# Patient Record
Sex: FEMALE | Race: WHITE | NOT HISPANIC OR LATINO | Employment: FULL TIME | URBAN - METROPOLITAN AREA
[De-identification: names, ages, dates, MRNs, and addresses within clinical notes are randomized per-mention and may not be internally consistent; named-entity substitution may affect disease eponyms.]

---

## 2018-01-18 ENCOUNTER — TELEPHONE (OUTPATIENT)
Dept: NURSING | Facility: CLINIC | Age: 33
End: 2018-01-18

## 2018-01-18 DIAGNOSIS — Z30.41 USES ORAL CONTRACEPTION: ICD-10-CM

## 2018-01-18 NOTE — TELEPHONE ENCOUNTER
[facility]      Last Written Prescription Date:  11/11/16  Last Fill Quantity: 84,   # refills: 4  Last Office Visit: 11/11/16  Future Office visit:    Next 5 appointments (look out 90 days)     Jan 31, 2018 11:15 AM CST   PHYSICAL with Asha Chavez MD   [facility]    [address] [place] [id number] [phone]                   Medication is being filled for 1 time refill only due to:  Patient needs to be seen because it has been more than one year since last visit.   .Pt due for annual, no appt scheduled. One month extension sent per [facility] protocol.

## 2018-01-31 ENCOUNTER — OFFICE VISIT (OUTPATIENT)
Dept: OBGYN | Facility: CLINIC | Age: 33
End: 2018-01-31
Payer: COMMERCIAL

## 2018-01-31 VITALS
HEIGHT: 70 IN | BODY MASS INDEX: 18.61 KG/M2 | SYSTOLIC BLOOD PRESSURE: 98 MMHG | WEIGHT: 130 LBS | DIASTOLIC BLOOD PRESSURE: 68 MMHG

## 2018-01-31 DIAGNOSIS — Z01.419 ENCOUNTER FOR GYNECOLOGICAL EXAMINATION WITHOUT ABNORMAL FINDING: Primary | ICD-10-CM

## 2018-01-31 PROCEDURE — 99385 PREV VISIT NEW AGE 18-39: CPT | Performed by: OBSTETRICS & GYNECOLOGY

## 2018-01-31 RX ORDER — NORGESTIMATE AND ETHINYL ESTRADIOL 0.25-0.035
1 KIT ORAL DAILY
Qty: 84 TABLET | Refills: 3 | Status: SHIPPED | OUTPATIENT
Start: 2018-01-31 | End: 2018-12-18

## 2018-01-31 RX ORDER — SPIRONOLACTONE 50 MG/1
TABLET, FILM COATED ORAL
Refills: 6 | COMMUNITY
Start: 2018-01-22 | End: 2020-03-21

## 2018-01-31 RX ORDER — SODIUM SULFACETAMIDE AND SULFUR 80; 40 MG/ML; MG/ML
LOTION TOPICAL
COMMUNITY
Start: 2017-12-22 | End: 2018-01-31

## 2018-01-31 NOTE — NURSING NOTE
"Chief Complaint   Patient presents with     Physical       Initial BP 98/68  Ht 5' 10\" (1.778 m)  Wt 130 lb (59 kg)  LMP 2018  BMI 18.65 kg/m2 Estimated body mass index is 18.65 kg/(m^2) as calculated from the following:    Height as of this encounter: 5' 10\" (1.778 m).    Weight as of this encounter: 130 lb (59 kg).  BP completed using cuff size: regular        The following HM Due: NONE      The following patient reported/Care Every where data was sent to:  P ABSTRACT QUALITY INITIATIVES [[id number]]        patient has appointment for today    Dasha Gardner CMA                "

## 2018-01-31 NOTE — MR AVS SNAPSHOT
"              After Visit Summary   1/31/2018    Jimena Irvin    MRN: [id number]           Patient Information     Date Of Birth          1985        Visit Information        Provider Department      1/31/2018 11:15 Asha García MD [facility]        Today's Diagnoses     Encounter for gynecological examination without abnormal finding    -  1       Follow-ups after your visit        Who to contact     If you have questions or need follow up information about today's clinic visit or your schedule please contact [facility] directly at [phone].  Normal or non-critical lab and imaging results will be communicated to you by [email or web address], letter or phone within 4 business days after the clinic has received the results. If you do not hear from us within 7 days, please contact the clinic through GeoOpticst or phone. If you have a critical or abnormal lab result, we will notify you by phone as soon as possible.  Submit refill requests through TerraSky or call your pharmacy and they will forward the refill request to us. Please allow 3 business days for your refill to be completed.          Additional Information About Your Visit        MyChart Information     TerraSky gives you secure access to your electronic health record. If you see a primary care provider, you can also send messages to your care team and make appointments. If you have questions, please call your primary care clinic.  If you do not have a primary care provider, please call [phone] and they will assist you.        Care EveryWhere ID     This is your Care EveryWhere ID. This could be used by other organizations to access your [facility] medical records  [id number]        Your Vitals Were     Height Last Period BMI (Body Mass Index)             5' 10\" (1.778 m) 01/24/2018 18.65 kg/m2          Blood Pressure from Last 3 Encounters:   01/31/18 98/68   11/11/16 98/70   11/10/15 104/60    Weight from Last " 3 Encounters:   01/31/18 130 lb (59 kg)   11/11/16 125 lb (56.7 kg)   11/10/15 128 lb (58.1 kg)              Today, you had the following     No orders found for display         Today's Medication Changes          These changes are accurate as of 1/31/18  2:51 PM.  If you have any questions, ask your nurse or doctor.               Start taking these medicines.        Dose/Directions    norgestimate-ethinyl estradiol 0.25-35 MG-MCG per tablet   Commonly known as:  ORTHO-CYCLEN, SPRINTEC   Used for:  Encounter for gynecological examination without abnormal finding   Started by:  Asha Chavez MD        Dose:  1 tablet   Take 1 tablet by mouth daily   Quantity:  84 tablet   Refills:  3         Stop taking these medicines if you haven't already. Please contact your care team if you have questions.     SULFACLEANSE 8/4 8-4 % Susp   Generic drug:  Sulfacetamide Sodium-Sulfur   Stopped by:  Asha Chavez MD                Where to get your medicines      These medications were sent to [place] [address] [place] [address]     Phone:  [phone]     norgestimate-ethinyl estradiol 0.25-35 MG-MCG per tablet                Primary Care Provider Office Phone # Fax #    [facility] [phone]       [address] [facility] [address]        Equal Access to Services     [facility] HospitalHUONG AH: Hadii caden sims hadasho Soomaali, waaxda luqadaha, qaybta kaalmada adeegyada, sana cleary. So [facility] [phone].    ATENCIÓN: Si habla español, tiene a thomas disposición servicios gratuitos de asistencia lingüística. Farrah al [phone].    We comply with applicable federal civil rights laws and [place] laws. We do not discriminate on the basis of race, color, national origin, age, disability, sex, sexual orientation, or gender identity.            Thank you!     Thank you for choosing [facility]  for your care. Our goal is always to provide you with excellent care. Hearing back from our patients is one way we can continue to improve our services. Please take a few minutes to complete the written survey that you may receive in the mail after your visit with us. Thank you!             Your Updated Medication List - Protect others around you: Learn how to safely use, store and throw away your medicines at www.disposemymeds.org.          This list is accurate as of 1/31/18  2:51 PM.  Always use your most recent med list.                   Brand Name Dispense Instructions for use Diagnosis    norgestimate-ethinyl estradiol 0.25-35 MG-MCG per tablet    ORTHO-CYCLEN, SPRINTEC    84 tablet    Take 1 tablet by mouth daily    Encounter for gynecological examination without abnormal finding       norgestrel-ethinyl estradiol 0.3-30 MG-MCG per tablet    LO/OVRAL    28 tablet    Take 1 tablet by mouth daily    Uses oral contraception       spironolactone 50 MG tablet    ALDACTONE     TAKE 1 TABLET BY MOUTH ONCE DAILY WITH A FULL GLASS OF WATER

## 2018-01-31 NOTE — PROGRESS NOTES
Jimena is a 32 year old  female who presents for annual exam.     Menses are regular q 28-30 days and normal lasting 5 days.  Menses flow: normal.  Patient's last menstrual period was 2018.. Using oral contraceptives for contraception.  She is possibly currently considering pregnancy.  Besides routine health maintenance, she has no other health concerns today . Thinking about pregnancy maybe in a year.    for [employer].  GYNECOLOGIC HISTORY:  Menarche: doesn't remember    Jimena is sexually active with 1 male partner(s) and is currently in monogamous relationship.    History sexually transmitted infections:No STD history  STI testing offered?  Accepted  TED exposure: No  History of abnormal Pap smear: NO - age 30- 65 PAP every 3 years recommended  Family history of breast CA: No  Family history of uterine/ovarian CA: No    Family history of colon CA: No    HEALTH MAINTENANCE:  Cholesterol: (No results found for: CHOL History of abnormal lipids: No  Mammo: no hx . History of abnormal Mammo: Not applicable.  Regular Self Breast Exams: No  Calcium/Vitamin D intake: source:  diet Adequate? No  TSH: (No results found for: TSH )  Pap; (  Lab Results   Component Value Date    PAP NIL 2016    )    HISTORY:  Obstetric History       T0      L0     SAB0   TAB0   Ectopic0   Multiple0   Live Births0         Past Medical History:   Diagnosis Date     Acne     minocycline     HSV-1 infection      Raynauds syndrome     has seen Dr. Zamora.      Past Surgical History:   Procedure Laterality Date     NO HISTORY OF SURGERY       Family History   Problem Relation Age of Onset     Family History Negative Other      Breast Cancer No family hx of      Social History     Social History     Marital status:      Spouse name: N/A     Number of children: 0     Years of education: N/A     Occupational History     manager [employer]     Social History Main Topics     Smoking status: Never Smoker  "    Smokeless tobacco: Never Used     Alcohol use 0.0 oz/week     0 Standard drinks or equivalent per week     Drug use: No     Sexual activity: Yes     Partners: Male     Birth control/ protection: Pill     Other Topics Concern     Not on file     Social History Narrative    Mom is Delmer Chahal (AJ pt.) but parents transferred to [place] for Dad's work in 2008.  Brother still lives in [place] and parents own condo here. Lived in [place] for 6 years but moved back in 2006       Current Outpatient Prescriptions:      spironolactone (ALDACTONE) 50 MG tablet, TAKE 1 TABLET BY MOUTH ONCE DAILY WITH A FULL GLASS OF WATER, Disp: , Rfl: 6     norgestrel-ethinyl estradiol (LO/OVRAL) 0.3-30 MG-MCG per tablet, Take 1 tablet by mouth daily, Disp: 28 tablet, Rfl: 0     Allergies   Allergen Reactions     Nkda [No Known Drug Allergies]        Past medical, surgical, social and family history were reviewed and updated in EPIC.    ROS:   C:     NEGATIVE for fever, chills, change in weight  I:       NEGATIVE for worrisome rashes, moles or lesions  E:     NEGATIVE for vision changes or irritation  E/M: NEGATIVE for ear, mouth and throat problems  R:     NEGATIVE for significant cough or SOB  CV:   NEGATIVE for chest pain, palpitations or peripheral edema  GI:     NEGATIVE for nausea, abdominal pain, heartburn, or change in bowel habits  :   NEGATIVE for frequency, dysuria, hematuria, vaginal discharge, or irregular bleeding  M:     NEGATIVE for significant arthralgias or myalgia  N:      NEGATIVE for weakness, dizziness or paresthesias  E:      NEGATIVE for temperature intolerance, skin/hair changes  P:      NEGATIVE for changes in mood or affect.    EXAM:  BP 98/68  Ht 5' 10\" (1.778 m)  Wt 130 lb (59 kg)  LMP 01/24/2018  BMI 18.65 kg/m2   BMI: Body mass index is 18.65 kg/(m^2).  Constitutional: healthy, alert and no distress  Head: Normocephalic. No masses, lesions, tenderness or abnormalities  Neck: Neck supple. Trachea midline. " No adenopathy. Thyroid symmetric, normal size.   Cardiovascular: RRR.   Respiratory: Negative.   Breast: Breasts reveal mild symmetric fibrocystic densities, but there are no dominant, discrete, fixed or suspicious masses found.  Gastrointestinal: Abdomen soft, non-tender, non-distended. No masses, organomegaly.  :  Vulva:  No external lesions, normal female hair distribution, no inguinal adenopathy.    Urethra:  Midline, non-tender, well supported, no discharge  Vagina:  Moist, pink, no abnormal discharge, no lesions  Uterus:  Normal size, anteverted , non-tender, freely mobile  Ovaries:  No masses appreciated, non-tender, mobile  Rectal Exam: deferred  Musculoskeletal: extremities normal  Skin: no suspicious lesions or rashes  Psychiatric: Affect appropriate, cooperative,mentation appears normal.     COUNSELING:   Reviewed preventive health counseling, as reflected in patient instructions       Regular exercise       Healthy diet/nutrition       Contraception       Family planning   reports that she has never smoked. She has never used smokeless tobacco.    Body mass index is 18.65 kg/(m^2).    FRAX Risk Assessment    ASSESSMENT:  32 year old female with satisfactory annual exam  (Z01.419) Encounter for gynecological examination without abnormal finding  (primary encounter diagnosis)  Comment:   Plan: norgestimate-ethinyl estradiol (ORTHO-CYCLEN,         SPRINTEC) 0.25-35 MG-MCG per tablet        Did better on noregestimate but also on a consistent (not changing) dose.  Discussed folic acid when ready to conceive.

## 2018-02-23 DIAGNOSIS — Z30.41 USES ORAL CONTRACEPTION: ICD-10-CM

## 2018-02-23 NOTE — TELEPHONE ENCOUNTER
norgestrel-ethinyl estradiol (LO/OVRAL) 0.3-30 MG-MCG per tablet     Last Written Prescription Date:  1/18/18  Last Fill Quantity: 28,   # refills: 0  Last Office Visit with [facility] primary care provider:  11/11/16  Future Office visit: none    Routing refill request to provider for review/approval because:  Refill denied. Seeing other  physician and received different OCP.

## 2018-02-26 ENCOUNTER — TELEPHONE (OUTPATIENT)
Dept: OBGYN | Facility: CLINIC | Age: 33
End: 2018-02-26

## 2018-02-26 NOTE — TELEPHONE ENCOUNTER
[facility] reaching out for a  RX refill- [facility]    Former pt of [facility].  Picked on call

## 2018-02-26 NOTE — TELEPHONE ENCOUNTER
LM for the pharmacist that the pt was not seen in our but at [facility] by Dr Asha Breaux. Please contact them for the medication refill.

## 2018-12-18 DIAGNOSIS — Z01.419 ENCOUNTER FOR GYNECOLOGICAL EXAMINATION WITHOUT ABNORMAL FINDING: ICD-10-CM

## 2018-12-18 NOTE — TELEPHONE ENCOUNTER
Pharmacy sent refill request for sprintec.  Pt due for AE 1/2019.  Pt can schedule to be seen in March when Dr. Chavez returns from maternity leave.  Refill can be sent once pt is scheduled.  Mayda Ramires RN

## 2018-12-19 RX ORDER — NORGESTIMATE AND ETHINYL ESTRADIOL 0.25-0.035
1 KIT ORAL DAILY
Qty: 84 TABLET | Refills: 0 | Status: SHIPPED | OUTPATIENT
Start: 2018-12-19 | End: 2019-03-12

## 2019-03-12 ENCOUNTER — OFFICE VISIT (OUTPATIENT)
Dept: OBGYN | Facility: CLINIC | Age: 34
End: 2019-03-12
Payer: COMMERCIAL

## 2019-03-12 VITALS
HEIGHT: 70 IN | WEIGHT: 125 LBS | SYSTOLIC BLOOD PRESSURE: 90 MMHG | HEART RATE: 68 BPM | BODY MASS INDEX: 17.9 KG/M2 | OXYGEN SATURATION: 98 % | DIASTOLIC BLOOD PRESSURE: 67 MMHG

## 2019-03-12 DIAGNOSIS — I73.00 RAYNAUD'S PHENOMENON WITHOUT GANGRENE: ICD-10-CM

## 2019-03-12 DIAGNOSIS — Z01.419 ENCOUNTER FOR GYNECOLOGICAL EXAMINATION WITHOUT ABNORMAL FINDING: Primary | ICD-10-CM

## 2019-03-12 DIAGNOSIS — Z23 NEED FOR TDAP VACCINATION: ICD-10-CM

## 2019-03-12 PROCEDURE — 90715 TDAP VACCINE 7 YRS/> IM: CPT | Performed by: OBSTETRICS & GYNECOLOGY

## 2019-03-12 PROCEDURE — 90471 IMMUNIZATION ADMIN: CPT | Performed by: OBSTETRICS & GYNECOLOGY

## 2019-03-12 PROCEDURE — 99395 PREV VISIT EST AGE 18-39: CPT | Mod: 25 | Performed by: OBSTETRICS & GYNECOLOGY

## 2019-03-12 RX ORDER — NORGESTIMATE AND ETHINYL ESTRADIOL 0.25-0.035
1 KIT ORAL DAILY
Qty: 84 TABLET | Refills: 3 | Status: SHIPPED | OUTPATIENT
Start: 2019-03-12 | End: 2020-02-10

## 2019-03-12 ASSESSMENT — ANXIETY QUESTIONNAIRES
7. FEELING AFRAID AS IF SOMETHING AWFUL MIGHT HAPPEN: NOT AT ALL
6. BECOMING EASILY ANNOYED OR IRRITABLE: NOT AT ALL
5. BEING SO RESTLESS THAT IT IS HARD TO SIT STILL: NOT AT ALL
2. NOT BEING ABLE TO STOP OR CONTROL WORRYING: NOT AT ALL
GAD7 TOTAL SCORE: 0
1. FEELING NERVOUS, ANXIOUS, OR ON EDGE: NOT AT ALL
3. WORRYING TOO MUCH ABOUT DIFFERENT THINGS: NOT AT ALL

## 2019-03-12 ASSESSMENT — PATIENT HEALTH QUESTIONNAIRE - PHQ9
SUM OF ALL RESPONSES TO PHQ QUESTIONS 1-9: 0
5. POOR APPETITE OR OVEREATING: NOT AT ALL

## 2019-03-12 ASSESSMENT — MIFFLIN-ST. JEOR: SCORE: 1354.31

## 2019-03-12 NOTE — PROGRESS NOTES
Jimena is a 33 year old  female who presents for annual exam.     Menses are regular q 28-30 days and normal lasting 5 days.  Menses flow: normal.  Patient's last menstrual period was 2019 (approximate).. Using oral contraceptives for contraception.  She is currently considering pregnancy maybe this fall/winter.   Besides routine health maintenance, she has no other health concerns today . Would like to see someone for her Reynaud's. Joints in hands and feet get very swollen with cold temperatures. Does not even need to be very cold, even just cooler internal temperatures. Had some evaluation by vascular surgery, antibodies negative except histone. Was thought to be drug induced from minocycline or OCs.   She may want to see someone again.   GYNECOLOGIC HISTORY:  Menarche: 16  Age at first intercourse: declined Number of lifetime partners: declined  Jimena is sexually active with 01 male partner(s) and is currently in monogamous relationship with .    History sexually transmitted infections:No STD history  STI testing offered?  Declined  TED exposure: Unknown  History of abnormal Pap smear: NO - age 30- 65 PAP every 3 years recommended  Family history of breast CA: No  Family history of uterine/ovarian CA: No    Family history of colon CA: No    HEALTH MAINTENANCE:  Cholesterol: (No results found for: CHOL History of abnormal lipids: No  Mammo: n/a . History of abnormal Mammo: No, Not applicable.  Regular Self Breast Exams: No  Calcium/Vitamin D intake: source:  dietary supplement(s) Adequate? Yes  TSH: (No results found for: TSH )  Pap; (  Lab Results   Component Value Date    PAP NIL 2016    )    HISTORY:  Obstetric History       T0      L0     SAB0   TAB0   Ectopic0   Multiple0   Live Births0         Past Medical History:   Diagnosis Date     Acne     minocycline     HSV-1 infection      Raynauds syndrome     has seen Dr. Zamora.      Past Surgical History:   Procedure  Laterality Date     NO HISTORY OF SURGERY       Family History   Problem Relation Age of Onset     Family History Negative Other      Breast Cancer No family hx of      Social History     Socioeconomic History     Marital status: Single     Spouse name: None     Number of children: 0     Years of education: None     Highest education level: None   Occupational History     Occupation: manager     Employer: [employer]   Social Needs     Financial resource strain: None     Food insecurity:     Worry: None     Inability: None     Transportation needs:     Medical: None     Non-medical: None   Tobacco Use     Smoking status: Never Smoker     Smokeless tobacco: Never Used   Substance and Sexual Activity     Alcohol use: Yes     Comment: 1-2/ day      Drug use: No     Sexual activity: Yes     Partners: Male     Birth control/protection: Pill   Lifestyle     Physical activity:     Days per week: None     Minutes per session: None     Stress: None   Relationships     Social connections:     Talks on phone: None     Gets together: None     Attends Protestant service: None     Active member of club or organization: None     Attends meetings of clubs or organizations: None     Relationship status: None     Intimate partner violence:     Fear of current or ex partner: None     Emotionally abused: None     Physically abused: None     Forced sexual activity: None   Other Topics Concern     Parent/sibling w/ CABG, MI or angioplasty before 65F 55M? Not Asked   Social History Narrative    Mom is Delmer Chahal (AJ pt.) but parents transferred to [place] for Dad's work in 2008.  Brother still lives in [place] and parents own condo here. Lived in [place] for 6 years but moved back in 2006       Current Outpatient Medications:      norgestimate-ethinyl estradiol (ORTHO-CYCLEN/SPRINTEC) 0.25-35 MG-MCG tablet, Take 1 tablet by mouth daily, Disp: 84 tablet, Rfl: 0     spironolactone (ALDACTONE) 50 MG tablet, TAKE 1 TABLET BY MOUTH ONCE  "DAILY WITH A FULL GLASS OF WATER, Disp: , Rfl: 6     Allergies   Allergen Reactions     Nkda [No Known Drug Allergies]        Past medical, surgical, social and family history were reviewed and updated in EPIC.    ROS:   C:     NEGATIVE for fever, chills, change in weight  I:       NEGATIVE for worrisome rashes, moles or lesions  E:     NEGATIVE for vision changes or irritation  E/M: NEGATIVE for ear, mouth and throat problems  R:     NEGATIVE for significant cough or SOB  CV:   NEGATIVE for chest pain, palpitations or peripheral edema  GI:     NEGATIVE for nausea, abdominal pain, heartburn, or change in bowel habits  :   NEGATIVE for frequency, dysuria, hematuria, vaginal discharge, or irregular bleeding  M:     NEGATIVE for significant arthralgias or myalgia  N:      NEGATIVE for weakness, dizziness or paresthesias  E:      NEGATIVE for temperature intolerance, skin/hair changes  P:      NEGATIVE for changes in mood or affect.    EXAM:  BP 90/67   Pulse 68   Ht 1.765 m (5' 9.5\")   Wt 56.7 kg (125 lb)   LMP 02/20/2019 (Approximate)   SpO2 98%   Breastfeeding? No   BMI 18.19 kg/m     BMI: Body mass index is 18.19 kg/m .  Constitutional: healthy, alert and no distress  Head: Normocephalic. No masses, lesions, tenderness or abnormalities  Neck: Neck supple. Trachea midline. No adenopathy. Thyroid symmetric, normal size.   Cardiovascular: RRR.   Respiratory: Negative.   Breast: Breasts reveal mild symmetric fibrocystic densities, but there are no dominant, discrete, fixed or suspicious masses found.  Gastrointestinal: Abdomen soft, non-tender, non-distended. No masses, organomegaly.  :  Vulva:  No external lesions, normal female hair distribution, no inguinal adenopathy.    Urethra:  Midline, non-tender, well supported, no discharge  Vagina:  Moist, pink, no abnormal discharge, no lesions  Uterus:  Normal size, anteverted , non-tender, freely mobile  Ovaries:  No masses appreciated, non-tender, " mobile  Rectal Exam: deferred  Musculoskeletal: extremities normal  Skin: no suspicious lesions or rashes  Psychiatric: Affect appropriate, cooperative,mentation appears normal.     COUNSELING:   Reviewed preventive health counseling, as reflected in patient instructions       Regular exercise       Healthy diet/nutrition       Family planning       Folic Acid Counseling   reports that  has never smoked. she has never used smokeless tobacco.    Body mass index is 18.19 kg/m .    FRAX Risk Assessment    ASSESSMENT:  33 year old female with satisfactory annual exam  (Z01.419) Encounter for gynecological examination without abnormal finding  (primary encounter diagnosis)  Comment:   Plan: norgestimate-ethinyl estradiol         (ORTHO-CYCLEN/SPRINTEC) 0.25-35 MG-MCG tablet            (Z23) Need for Tdap vaccination  Comment:   Plan: TDAP, IM (10 - 64 YRS) - Adacel            (I73.00) Raynaud's phenomenon without gangrene  Comment:   Plan: Can send to rheumatology when she is ready.

## 2019-03-12 NOTE — NURSING NOTE
"Chief Complaint   Patient presents with     Physical       Initial BP 90/67   Pulse 68   Ht 1.765 m (5' 9.5\")   Wt 56.7 kg (125 lb)   LMP 2019 (Approximate)   SpO2 98%   Breastfeeding? No   BMI 18.19 kg/m   Estimated body mass index is 18.19 kg/m  as calculated from the following:    Height as of this encounter: 1.765 m (5' 9.5\").    Weight as of this encounter: 56.7 kg (125 lb).  BP completed using cuff size: regular    Questioned patient about current smoking habits.  Pt. has never smoked.          The following HM Due: Vaccinations: tdap      The following patient reported/Care Every where data was sent to:  P ABSTRACT QUALITY INITIATIVES [[id number]]  N/a       patient has appointment for today              "

## 2019-03-13 ASSESSMENT — ANXIETY QUESTIONNAIRES: GAD7 TOTAL SCORE: 0

## 2019-11-08 ENCOUNTER — TELEPHONE (OUTPATIENT)
Dept: RHEUMATOLOGY | Facility: CLINIC | Age: 34
End: 2019-11-08

## 2019-11-08 NOTE — LETTER
January 6, 2020    Jimena Irvin  [address] [facility] [address]    Dear Referring Provider,  Thank you for the referral. After careful review by the Rheumatologist, your patient does not meet the criteria for an appointment. We encourage you to refer your patient to one of our [facility] sites:    [facility]    Provider of your choice  Thank you for your support and understanding.    Sincerely,  The Division of Arthritis and Autoimmune Disorders

## 2019-11-08 NOTE — TELEPHONE ENCOUNTER
[facility] Call Center    Phone Message    May a detailed message be left on voicemail: yes    Reason for Call: Other: Pt called in and wanted to be scheduled per her referral for  Raynaud's phenomenon without gangrene. Writer did explain in take process. Please advise     Action Taken: Message routed to:  [facility] ([facility]

## 2020-01-03 NOTE — TELEPHONE ENCOUNTER
Referral from Asha Chavez for Raynaud's. Per Referring OV notes, patient has swollen joints in hands and feet in colder temperatures. There is no current labs/imaging in chart. Chart has been prepared for Rheumatologist review. Review process can take 1-2 weeks.   Arabella Vences CMA   1/3/2020 2:54 PM

## 2020-01-06 NOTE — TELEPHONE ENCOUNTER
Rheumatologist has reviewed chart and has made the determination that the patient does not meet the criteria for an appointment.  A letter has been sent the referring encouraging them to send the patient to one of our community sites. Patient needs to follow up with their referring or PCP for further direction.     Arabella Vences CMA   1/6/2020 10:13 AM

## 2020-02-10 DIAGNOSIS — Z01.419 ENCOUNTER FOR GYNECOLOGICAL EXAMINATION WITHOUT ABNORMAL FINDING: ICD-10-CM

## 2020-02-10 RX ORDER — NORGESTIMATE AND ETHINYL ESTRADIOL 0.25-0.035
1 KIT ORAL DAILY
Qty: 84 TABLET | Refills: 0 | Status: SHIPPED | OUTPATIENT
Start: 2020-02-10 | End: 2020-03-21

## 2020-02-10 NOTE — TELEPHONE ENCOUNTER
Signed Prescriptions:                        Disp   Refills    norgestimate-ethinyl estradiol (ORTHO-CYCL*84 tab*0        Sig: Take 1 tablet by mouth daily  Authorizing Provider: KASI HERNANDEZ  Ordering User: FELI GONZALEZ    Rx sent. No refills given. Pharmacy will let patient know she is due for AE next month.  Feli Gonzalez RN

## 2020-03-19 ENCOUNTER — APPOINTMENT (OUTPATIENT)
Dept: OBGYN | Facility: CLINIC | Age: 35
End: 2020-03-19
Payer: COMMERCIAL

## 2020-03-21 ENCOUNTER — PRENATAL OFFICE VISIT (OUTPATIENT)
Dept: OBGYN | Facility: CLINIC | Age: 35
End: 2020-03-21
Payer: COMMERCIAL

## 2020-03-21 DIAGNOSIS — Z34.00 PRENATAL CARE, FIRST PREGNANCY: ICD-10-CM

## 2020-03-21 PROCEDURE — 99207 ZZC NO CHARGE NURSE ONLY: CPT | Performed by: OBSTETRICS & GYNECOLOGY

## 2020-03-21 RX ORDER — PRENATAL VIT/IRON FUM/FOLIC AC 27MG-0.8MG
1 TABLET ORAL DAILY
COMMUNITY
Start: 2019-11-21 | End: 2022-03-03

## 2020-03-22 ENCOUNTER — HEALTH MAINTENANCE LETTER (OUTPATIENT)
Age: 35
End: 2020-03-22

## 2020-04-06 ENCOUNTER — PRENATAL OFFICE VISIT (OUTPATIENT)
Dept: OBGYN | Facility: CLINIC | Age: 35
End: 2020-04-06
Payer: COMMERCIAL

## 2020-04-06 ENCOUNTER — TRANSCRIBE ORDERS (OUTPATIENT)
Dept: MATERNAL FETAL MEDICINE | Facility: CLINIC | Age: 35
End: 2020-04-06

## 2020-04-06 VITALS
DIASTOLIC BLOOD PRESSURE: 73 MMHG | WEIGHT: 126 LBS | SYSTOLIC BLOOD PRESSURE: 113 MMHG | BODY MASS INDEX: 18.34 KG/M2 | HEART RATE: 72 BPM

## 2020-04-06 DIAGNOSIS — O26.90 PREGNANCY RELATED CONDITION, ANTEPARTUM: Primary | ICD-10-CM

## 2020-04-06 DIAGNOSIS — Z34.01 ENCOUNTER FOR PRENATAL CARE IN FIRST TRIMESTER OF FIRST PREGNANCY: Primary | ICD-10-CM

## 2020-04-06 LAB
ABO + RH BLD: NORMAL
ABO + RH BLD: NORMAL
ALBUMIN UR-MCNC: NEGATIVE MG/DL
APPEARANCE UR: CLEAR
BILIRUB UR QL STRIP: NEGATIVE
BLD GP AB SCN SERPL QL: NORMAL
BLOOD BANK CMNT PATIENT-IMP: NORMAL
COLOR UR AUTO: YELLOW
ERYTHROCYTE [DISTWIDTH] IN BLOOD BY AUTOMATED COUNT: 12.9 % (ref 10–15)
GLUCOSE UR STRIP-MCNC: NEGATIVE MG/DL
HCT VFR BLD AUTO: 42.1 % (ref 35–47)
HGB BLD-MCNC: 14.3 G/DL (ref 11.7–15.7)
HGB UR QL STRIP: ABNORMAL
KETONES UR STRIP-MCNC: NEGATIVE MG/DL
LEUKOCYTE ESTERASE UR QL STRIP: NEGATIVE
MCH RBC QN AUTO: 31.6 PG (ref 26.5–33)
MCHC RBC AUTO-ENTMCNC: 34 G/DL (ref 31.5–36.5)
MCV RBC AUTO: 93 FL (ref 78–100)
NITRATE UR QL: NEGATIVE
PH UR STRIP: 7 PH (ref 5–7)
PLATELET # BLD AUTO: 162 10E9/L (ref 150–450)
RBC # BLD AUTO: 4.52 10E12/L (ref 3.8–5.2)
SOURCE: ABNORMAL
SP GR UR STRIP: 1.01 (ref 1–1.03)
SPECIMEN EXP DATE BLD: NORMAL
UROBILINOGEN UR STRIP-ACNC: 0.2 EU/DL (ref 0.2–1)
WBC # BLD AUTO: 8.4 10E9/L (ref 4–11)

## 2020-04-06 PROCEDURE — 85027 COMPLETE CBC AUTOMATED: CPT | Performed by: OBSTETRICS & GYNECOLOGY

## 2020-04-06 PROCEDURE — G0145 SCR C/V CYTO,THINLAYER,RESCR: HCPCS | Performed by: OBSTETRICS & GYNECOLOGY

## 2020-04-06 PROCEDURE — 99207 ZZC FIRST OB VISIT: CPT | Performed by: OBSTETRICS & GYNECOLOGY

## 2020-04-06 PROCEDURE — 87340 HEPATITIS B SURFACE AG IA: CPT | Performed by: OBSTETRICS & GYNECOLOGY

## 2020-04-06 PROCEDURE — 36415 COLL VENOUS BLD VENIPUNCTURE: CPT | Performed by: OBSTETRICS & GYNECOLOGY

## 2020-04-06 PROCEDURE — 86850 RBC ANTIBODY SCREEN: CPT | Performed by: OBSTETRICS & GYNECOLOGY

## 2020-04-06 PROCEDURE — 86762 RUBELLA ANTIBODY: CPT | Performed by: OBSTETRICS & GYNECOLOGY

## 2020-04-06 PROCEDURE — 87389 HIV-1 AG W/HIV-1&-2 AB AG IA: CPT | Performed by: OBSTETRICS & GYNECOLOGY

## 2020-04-06 PROCEDURE — 86780 TREPONEMA PALLIDUM: CPT | Performed by: OBSTETRICS & GYNECOLOGY

## 2020-04-06 PROCEDURE — 86901 BLOOD TYPING SEROLOGIC RH(D): CPT | Performed by: OBSTETRICS & GYNECOLOGY

## 2020-04-06 PROCEDURE — 86900 BLOOD TYPING SEROLOGIC ABO: CPT | Performed by: OBSTETRICS & GYNECOLOGY

## 2020-04-06 PROCEDURE — 87624 HPV HI-RISK TYP POOLED RSLT: CPT | Performed by: OBSTETRICS & GYNECOLOGY

## 2020-04-06 PROCEDURE — 81003 URINALYSIS AUTO W/O SCOPE: CPT | Performed by: OBSTETRICS & GYNECOLOGY

## 2020-04-06 PROCEDURE — 87086 URINE CULTURE/COLONY COUNT: CPT | Performed by: OBSTETRICS & GYNECOLOGY

## 2020-04-06 NOTE — NURSING NOTE
"Chief Complaint   Patient presents with     Prenatal Care     10+5       Initial /73   Pulse 72   Wt 57.2 kg (126 lb)   LMP 2020   BMI 18.34 kg/m   Estimated body mass index is 18.34 kg/m  as calculated from the following:    Height as of 3/12/19: 1.765 m (5' 9.5\").    Weight as of this encounter: 57.2 kg (126 lb).  BP completed using cuff size: regular    Questioned patient about current smoking habits.  Pt. has never smoked.          The following HM Due: pap smear      The following patient reported/Care Every where data was sent to:  P ABSTRACT QUALITY INITIATIVES [[id number]]        patient has appointment for today  Janny Rodriguez                "

## 2020-04-06 NOTE — PROGRESS NOTES
CC: New Ob visit  HPI: Jimena Irvin is a 34 year old  here for new Ob visit.  Patient's last menstrual period was 2020..  She is 10w5d by known LMP, giving her an EDC of 10/28/20.  The pregnancy was planned and she and her  are feeling excited.    This far the pregnancy has been uneventful other than mild nausea.  She works from [employer] in [employer] and is working strictly from home.    Past Medical History:   Diagnosis Date     Acne     minocycline     Chickenpox      HSV-1 infection      Raynauds syndrome     has seen Dr. Zamora.        Past Surgical History:   Procedure Laterality Date     MOUTH SURGERY      wisdom teeth     OB History    Para Term  AB Living   1 0 0 0 0 0   SAB TAB Ectopic Multiple Live Births   0 0 0 0 0      # Outcome Date GA Lbr Juan/2nd Weight Sex Delivery Anes PTL Lv   1 Current                    Current Outpatient Medications:      Prenatal Vit-Fe Fumarate-FA (PRENATAL MULTIVITAMIN W/IRON) 27-0.8 MG tablet, Take 1 tablet by mouth daily, Disp: , Rfl:     Allergies   Allergen Reactions     Nkda [No Known Drug Allergies]        Family History   Problem Relation Age of Onset     Family History Negative Other      Hypertension Father      Dementia Maternal Grandmother      Cancer Maternal Grandfather      Breast Cancer No family hx of        Past medical, social, surgical and family history were reviewed and updated in EPIC.    ROS: No TIA's or unusual headaches, no dysphagia.  No prolonged cough. No dyspnea or chest pain on exertion.  No abdominal pain, change in bowel habits, black or bloody stools.  No urinary tract symptoms.  No new or unusual musculoskeletal symptoms.  Normal menses, no abnormal vaginal bleeding, discharge or unexpected pelvic pain. No new breast lumps, breast pain or nipple discharge.    PE: /73   Pulse 72   Wt 57.2 kg (126 lb)   LMP 2020   BMI 18.34 kg/m      Gen: Healthy appearing female in no acute distress  Heart:  RRR  Lungs: CTAB  Abd: +BS, SNT  Ex: No C/C/E, no suspicious rashes or lesions    Pelvic: Normal vulva and vagina with scant white d/c.  Cervix without lesions, no CMT.  Uterus approximately 10 week size, mobile, NT.  Adnexa NT, no masses.    BSUS: single live IUP, +FCA, +FM.  CRL 10w 6d    A/P:  1) IUP at 10w5d         PNL today, pap today        Reviewed anticipated course of prenatal care        Reviewed recommendations for weight gain, activity and diet        We discussed options for genetic screening and diagnosis including CVS/amnio, first trimester screen and quad screen.  We discussed a fetal survey will be performed around 18-20 weeks. She desires genetic screening, NIPT if covered.  Will order and she will check with insurance        Discussed COVID and pregnancy.  We discussed the modified prenatal visit schedule due to the COVID pandemic.        Discussed MD call schedule as well as role of residents and med students both in clinic and hospital.  She is ok with resident care  Discussed modified MD schedule due to COVID paindemic       Phone visit 4 weeks, office visit 8 weeks with survey    Radha Schaefer MD

## 2020-04-07 LAB
BACTERIA SPEC CULT: NO GROWTH
HBV SURFACE AG SERPL QL IA: NONREACTIVE
HIV 1+2 AB+HIV1 P24 AG SERPL QL IA: NONREACTIVE
RUBV IGG SERPL IA-ACNC: 36 IU/ML
SPECIMEN SOURCE: NORMAL
T PALLIDUM AB SER QL: NONREACTIVE

## 2020-04-08 LAB
COPATH REPORT: NORMAL
PAP: NORMAL

## 2020-04-09 ENCOUNTER — VIRTUAL VISIT (OUTPATIENT)
Dept: MATERNAL FETAL MEDICINE | Facility: CLINIC | Age: 35
End: 2020-04-09
Attending: OBSTETRICS & GYNECOLOGY
Payer: COMMERCIAL

## 2020-04-09 DIAGNOSIS — O26.90 PREGNANCY RELATED CONDITION, ANTEPARTUM: ICD-10-CM

## 2020-04-09 DIAGNOSIS — Z13.79 ENCOUNTER FOR GENETIC SCREENING FOR DOWN SYNDROME: Primary | ICD-10-CM

## 2020-04-09 PROCEDURE — 40000072 ZZH STATISTIC GENETIC COUNSELING, < 16 MIN: Mod: TEL,ZF | Performed by: GENETIC COUNSELOR, MS

## 2020-04-09 NOTE — PROGRESS NOTES
[facility] [place]  Genetic Counseling Consult    Patient: Jimena Irvin YOB: 1985   Date of Service: 20      Jimena Irvin was evaluated via a billable telephone visit at [facility] for genetic consultation.      The patient has been notified of following:    This telephone visit will be conducted via a call between you and your physician/provider. We have found that certain health care needs can be provided without the need for a physical exam. This service lets us provide the care you need with a short phone conversation. If a prescription is necessary we can send it directly to your pharmacy. If lab work is needed we can place an order for that and you can then stop by our lab to have the test done at a later time.     If during the course of the call the provider feels a telephone visit is not appropriate, you will not be charged for this service.    Impression/Plan:   1.  Jimena completed a virtual genetic consultation today. Her partner, Darron, was also present for today's discussion. After thorough discussion of genetic screening options, Jimena desires to pursue non-invasive prenatal screening (NIPT). Per her request, an NIPT testing kit will be mailed to her home address. She will then present to the [facility] outpatient laboratory for blood draw. Results are expected within 7-10 days of blood draw, and will be available in [employer].  We will contact her to discuss the results, and a copy will be forwarded to the office of the referring OB provider. She requests a detailed voice message, including fetal sex, be left at [phone] if she is unavailable.    2. Maternal serum AFP (single marker screen) is recommended after 15 weeks to screen for open neural tube defects. A quad screen should not be performed.    Pregnancy History:   /Parity:    Age at Delivery: 34 year old  PATRICIA: 10/28/2020, by Last Menstrual  Period  Gestational Age: 11w1d    No significant complications or exposures were reported in the current pregnancy.    Medical History:   Jimena cooley reported medical history is not expected to impact pregnancy management or risks to fetal development.       Family History:   A three-generation pedigree was obtained, and is scanned under the  Media  tab.   The reported family history is negative for multiple miscarriages, stillbirths, birth defects, mental retardation, known genetic conditions, and consanguinity.       Carrier Screening:   The patient reports that she and the father of the pregnancy have  ancestry:      Cystic fibrosis is an autosomal recessive genetic condition that occurs with increased frequency in individuals of  ancestry and carrier screening for this condition is available.  In addition,  screening in the [facility] includes cystic fibrosis.      Expanded carrier screening for mutations in a large panel of genes associated with autosomal recessive conditions including cystic fibrosis, spinal muscular atrophy, and others, is now available.      If Jimena and/or her partner choose to pursue carrier screening, [facility] clinic is available to assist in coordination if desired.        Risk Assessment for Chromosome Conditions:   We explained that the risk for fetal chromosome abnormalities increases with maternal age. We discussed specific features of common chromosome abnormalities, including Down syndrome, trisomy 13, trisomy 18, and sex chromosome trisomies.      - At age 34 at midtrimester, the risk to have a baby with Down syndrome is 1 in 508.     - At age 34 at midtrimester, the risk to have a baby with any chromosome abnormality is 1 in 254.        Testing Options:   We discussed the following options:   Non-invasive Prenatal Testing (NIPT)    Maternal plasma cell-free DNA testing; first trimester ultrasound with nuchal translucency and nasal bone assessment is  recommended, when appropriate    Screens for fetal trisomy 21, trisomy 13, trisomy 18, and sex chromosome aneuploidy    Cannot screen for open neural tube defects; maternal serum AFP after 15 weeks is recommended     Chorionic villus sampling (CVS)    Invasive procedure typically performed in the first trimester by which placental villi are obtained for the purpose of chromosome analysis and/or other prenatal genetic analysis    Diagnostic results; >99% sensitivity for fetal chromosome abnormalities    Cannot test for open neural tube defects; maternal serum AFP after 15 weeks is recommended     Genetic Amniocentesis    Invasive procedure typically performed in the second trimester by which amniotic fluid is obtained for the purpose of chromosome analysis and/or other prenatal genetic analysis    Diagnostic results; >99% sensitivity for fetal chromosome abnormalities    AFAFP measurement tests for open neural tube defects     Comprehensive (Level II) ultrasound: Detailed ultrasound performed between 18-22 weeks gestation to screen for major birth defects and markers for aneuploidy.      We reviewed the benefits and limitations of this testing.  Screening tests provide a risk assessment specific to the pregnancy for certain fetal chromosome abnormalities, but cannot definitively diagnose or exclude a fetal chromosome abnormality.  Follow-up genetic counseling and consideration of diagnostic testing is recommended with any abnormal screening result.        Phone call contact time  Call Started at 10:15  Call Ended at 10:47    Jimena Moreau MS, [facility]  Maternal Fetal Medicine  [facility]  Ph: [phone]  [email or web address]

## 2020-04-10 LAB
FINAL DIAGNOSIS: NORMAL
HPV HR 12 DNA CVX QL NAA+PROBE: NEGATIVE
HPV16 DNA SPEC QL NAA+PROBE: NEGATIVE
HPV18 DNA SPEC QL NAA+PROBE: NEGATIVE
SPECIMEN DESCRIPTION: NORMAL
SPECIMEN SOURCE CVX/VAG CYTO: NORMAL

## 2020-04-13 DIAGNOSIS — Z13.79 ENCOUNTER FOR GENETIC SCREENING FOR DOWN SYNDROME: ICD-10-CM

## 2020-04-13 PROCEDURE — 40000791 ZZHCL STATISTIC VERIFI PRENATAL TRISOMY 21,18,13: Performed by: OBSTETRICS & GYNECOLOGY

## 2020-04-13 PROCEDURE — 36415 COLL VENOUS BLD VENIPUNCTURE: CPT | Performed by: OBSTETRICS & GYNECOLOGY

## 2020-04-23 ENCOUNTER — TELEPHONE (OUTPATIENT)
Dept: MATERNAL FETAL MEDICINE | Facility: CLINIC | Age: 35
End: 2020-04-23

## 2020-04-23 LAB — LAB SCANNED RESULT: NORMAL

## 2020-04-23 NOTE — TELEPHONE ENCOUNTER
"4/23/2020    I called Jimena to discuss her normal \"Innatal\" cell-free fetal DNA screening results. This information was left in a detailed voice message per her request. Results came back negative for chromosome abnormalities in chromosomes 21, 18, & 13, as well as the sex chromosomes.  These normal results suggest the likelihood of fetal Down syndrome, trisomy 13, or trisomy 18 is very low. Results consistent with two sex chromosomes (XX) (See scanned report under lab tab in epic). Sex chromosome information was disclosed on Jimena's voicemail.    Discussed high detection rates for fetal Down syndrome, trisomies 13 and 18, and fetal sex chromosome abnormalities; however, not 100%. While this test is a highly accurate screen, it does not replace the diagnostic capabilities of standard prenatal diagnostic tests such as amniocentesis and CVS.      Plan:  Jimena is not currently scheduled for follow-up with [facility]. MSAFP is the appropriate second trimester screening test for open neural tube defects; the maternal quad screen is not indicated.      Jimena Moreau MS, [facility]  Licensed Genetic Counselor  Phone: [phone]  Pager: [phone]    "

## 2020-05-04 ENCOUNTER — PRENATAL OFFICE VISIT (OUTPATIENT)
Dept: OBGYN | Facility: CLINIC | Age: 35
End: 2020-05-04
Payer: COMMERCIAL

## 2020-05-04 DIAGNOSIS — Z34.02 ENCOUNTER FOR PRENATAL CARE IN SECOND TRIMESTER OF FIRST PREGNANCY: Primary | ICD-10-CM

## 2020-05-04 PROCEDURE — 99207 ZZC PRENATAL VISIT: CPT | Performed by: OBSTETRICS & GYNECOLOGY

## 2020-05-04 NOTE — PROGRESS NOTES
Doing well.   Nausea has improved. Not a lot of vomiting but nausea was pretty bad.   No headaches.   NIPT normal, having a girl. Ultrasound scheduled for 6/3 with appt.   Discussed current practices for COVID inpatient and ambulatory. [place] masking, COVID testing for labor and delivery patients, temp testing for visitors, visitor policy.   RTC 4 weeks.  Phone visit for modified prenatal care for COVID. Time 10 minutes.

## 2020-06-03 ENCOUNTER — PRENATAL OFFICE VISIT (OUTPATIENT)
Dept: OBGYN | Facility: CLINIC | Age: 35
End: 2020-06-03
Attending: OBSTETRICS & GYNECOLOGY
Payer: COMMERCIAL

## 2020-06-03 ENCOUNTER — ANCILLARY PROCEDURE (OUTPATIENT)
Dept: ULTRASOUND IMAGING | Facility: CLINIC | Age: 35
End: 2020-06-03
Attending: OBSTETRICS & GYNECOLOGY
Payer: COMMERCIAL

## 2020-06-03 VITALS
HEART RATE: 59 BPM | SYSTOLIC BLOOD PRESSURE: 96 MMHG | BODY MASS INDEX: 19.08 KG/M2 | WEIGHT: 131.1 LBS | DIASTOLIC BLOOD PRESSURE: 61 MMHG

## 2020-06-03 DIAGNOSIS — Z34.02 ENCOUNTER FOR PRENATAL CARE IN SECOND TRIMESTER OF FIRST PREGNANCY: Primary | ICD-10-CM

## 2020-06-03 DIAGNOSIS — O44.02 PLACENTA PREVIA IN SECOND TRIMESTER: ICD-10-CM

## 2020-06-03 DIAGNOSIS — Z34.01 ENCOUNTER FOR PRENATAL CARE IN FIRST TRIMESTER OF FIRST PREGNANCY: ICD-10-CM

## 2020-06-03 PROCEDURE — 82105 ALPHA-FETOPROTEIN SERUM: CPT | Performed by: OBSTETRICS & GYNECOLOGY

## 2020-06-03 PROCEDURE — 99207 ZZC PRENATAL VISIT: CPT | Performed by: OBSTETRICS & GYNECOLOGY

## 2020-06-03 PROCEDURE — 76805 OB US >/= 14 WKS SNGL FETUS: CPT | Performed by: OBSTETRICS & GYNECOLOGY

## 2020-06-03 PROCEDURE — 36415 COLL VENOUS BLD VENIPUNCTURE: CPT | Performed by: OBSTETRICS & GYNECOLOGY

## 2020-06-03 PROCEDURE — 99000 SPECIMEN HANDLING OFFICE-LAB: CPT | Performed by: OBSTETRICS & GYNECOLOGY

## 2020-06-03 NOTE — PROGRESS NOTES
[facility]- OBGYN    CC: routine prenatal visit.    S:  Patient reports feeling well.  She reports small amount of spotting after intercourse at 8 weeks, no bleeding since then.  Patient denies any cramping, leakage of fluid.        O:   Patient Vitals for the past 24 hrs:   BP Pulse Weight   06/03/20 1547 96/61 59 59.5 kg (131 lb 1.6 oz)   ]  Exam:  General- awake, alert, answering questions appropriately, appears comfortable  CV- regular rate  Lung- breathing comfortably on room air  Ext- bilateral lower extremities with no edema    Ultrasound-    Obstetrical Ultrasound Report  OB U/S > 14 Weeks - Transabdominal  [facility]  Referring Provider: Dr. Radha Schaefer  Sonographer: Guillermina Carrasco RDMS  Indication:  Fetal Anatomy Survey     Dating (mm/dd/yyyy):   LMP: Patient's last menstrual period was 01/22/2020.              EDC:  Estimated Date of Delivery: Oct 28, 2020              GA by LMP:        19w0d     Current Scan On:  6/3/2020            EDC:  10/27/20              GA by Current Scan:        19w1d  The calculation of the gestational age by current scan was based on BPD, HC, AC and FL.  Anatomy Scan:  O[place]eda gestation.  Biometry:  BPD 4.4 cm 19w3d   HC 16.4 cm 19w1d   AC 14.1 cm 19w3d   FL 2.9 cm 18w6d   Cerebellum 1.9 cm 18w5d   CM 3.0 mm     Lat Vent 5.1 mm     NF 4.6 mm     EFW (lbs/oz) 0 lbs               10ozs     EFW (g) 279 g        Fetal heart activity: Rate and rhythm is within normal limits. Fetal heart rate: 158 bpm  Fetal presentation: Cephalic  Amniotic fluid: 5.3 cm MVP    Cord: 3 Vessel Cord  Placenta: Anterior and Previa  Fetal Anatomy:   Visualized with normal appearance: Head, Ventricles, Cerebellum, CSP, Face, Nose/Lips , Profile, 4 Chamber Heart, RVOT, LVOT, Spine, Kidneys, Stomach, Diaphragm, Abdominal Cord Insertion, Bladder, Arms, Legs and Gender: Female     Maternal Structures:  Cervix: The cervix appears long and closed.  Cervical Length:  5.2cm  Right Adnexa: wnl  Left Adnexa: wnl     Impression:  19w 1d fetus with PATRICIA by today's ultrasound 10/27/2020.  The fetal anatomic survey is normal.       The placenta is anterior, previa.       Kerrie Fernandez MD       A&P: Jimena Irvin is a 34 year old  at 19w0d by LMP who presents today for routine prenatal visit.    (Z34.02) Encounter for prenatal care in second trimester of first pregnancy  (primary encounter diagnosis)  Comment: Doing well.  Discussed anatomy ultrasound preliminary results.  Reviewed finding or placenta previa and discussed recommended pelvic rest.  Reviewed AFP blood draw today.     Plan: OB hemoglobin, Glucose tolerance gest screen 1         hour, Alpha fetoprotein maternal screen        Phone visit in 4-5 weeks and in person visit in 8-9 weeks.     (O44.02) Placenta previa in second trimester  Comment: Placenta anterior previa.  Reviewed finding.  Discussed potential for resolution as uterus grows in pregnancy.  Recommend pelvic rest.  Discussed if no resolution, then  recommended for delivery.  Plan: US OB >14 Weeks Follow Up    Marion Causey MD

## 2020-06-03 NOTE — PATIENT INSTRUCTIONS
Ultrasound shows placenta previa.  This can resolve during pregnancy as the uterus grows.  We will repeat an ultrasound in 4-6 weeks.  With in person visit at that time.    Patient Education     Placenta Previa    Placenta previa is a condition that may happen during the second or third trimester of pregnancy. It's one of the most common causes of vaginal bleeding during these trimesters. It happens when the placenta implants in the lower part of the uterus. This causes the placenta to block part or all of the opening of the cervix to the vagina (birth canal). It can lead to problems for both the mother and baby, including blood loss and premature labor.  Some factors that make placenta previa more likely  Factors include the following:    Multiple pregnancy (carrying more than one baby)    Previous pregnancies and deliveries    Previous myomectomy (removal of uterine fibroids through an incision in the uterus)    Previous  section (if the scar is low and close to the vaginal cervix)    Smoking cigarettes  Diagnosing the problem  Placenta previa can cause painless bleeding during the second or third trimester. If this happens, an ultrasound test can confirm the problem. But the problem can be present without bleeding. So your healthcare provider will check the position of the placenta during routine ultrasound exams.  Treating the problem  Depending on the amount of bleeding, the type of placenta previa, and the stage of the pregnancy, the following treatments may be recommended:    Partial or complete bed rest for the mother    Blood transfusions to replace maternal blood loss    Medicines to help mature the baby s lungs or prevent premature labor     delivery (this may be done immediately if bleeding cannot be stopped)  During treatment  Even if you are not on bed rest, your healthcare provider may ask you to restrict your activity. You will likely be told to:    Avoid intercourse    Limit  traveling    Avoid pelvic exams  Getting support  It may be frustrating and frightening to be diagnosed with a problem during pregnancy. It may help to join a support group for women who are going through the same problem. Ask your healthcare provider to help you find a support group in your area. Or try the Internet.   Date Last Reviewed: 6/1/2016 2000-2019 [employer]. [address] [place] [address]. All rights reserved. This information is not intended as a substitute for professional medical care. Always follow your healthcare professional's instructions.

## 2020-06-06 LAB
# FETUSES US: NORMAL
# FETUSES: NORMAL
AFP ADJ MOM AMN: 0.87
AFP SERPL-MCNC: 47 NG/ML
AGE - REPORTED: 32.9 YR
CURRENT SMOKER: NO
CURRENT SMOKER: NO
DIABETES STATUS PATIENT: NO
FAMILY MEMBER DISEASES HX: NO
FAMILY MEMBER DISEASES HX: NO
GA METHOD: NORMAL
GA METHOD: NORMAL
GA: NORMAL WK
IDDM PATIENT QL: NO
INTEGRATED SCN PATIENT-IMP: NORMAL
LMP START DATE: NORMAL
MONOCHORIONIC TWINS: NO
SERVICE CMNT-IMP: NO
SPECIMEN DRAWN SERPL: NORMAL
VALPROIC/CARBAMAZEPINE STATUS: NO
WEIGHT UNITS: NORMAL

## 2020-07-09 ENCOUNTER — ANCILLARY PROCEDURE (OUTPATIENT)
Dept: ULTRASOUND IMAGING | Facility: CLINIC | Age: 35
End: 2020-07-09
Attending: OBSTETRICS & GYNECOLOGY
Payer: COMMERCIAL

## 2020-07-09 ENCOUNTER — PRENATAL OFFICE VISIT (OUTPATIENT)
Dept: OBGYN | Facility: CLINIC | Age: 35
End: 2020-07-09
Attending: OBSTETRICS & GYNECOLOGY
Payer: COMMERCIAL

## 2020-07-09 VITALS
WEIGHT: 133.9 LBS | DIASTOLIC BLOOD PRESSURE: 66 MMHG | HEART RATE: 67 BPM | SYSTOLIC BLOOD PRESSURE: 101 MMHG | BODY MASS INDEX: 19.49 KG/M2

## 2020-07-09 DIAGNOSIS — R73.09 ELEVATED GLUCOSE: Primary | ICD-10-CM

## 2020-07-09 DIAGNOSIS — O44.02 PLACENTA PREVIA IN SECOND TRIMESTER: ICD-10-CM

## 2020-07-09 DIAGNOSIS — R73.09 ELEVATED GLUCOSE TOLERANCE TEST: ICD-10-CM

## 2020-07-09 DIAGNOSIS — Z34.02 ENCOUNTER FOR PRENATAL CARE IN SECOND TRIMESTER OF FIRST PREGNANCY: ICD-10-CM

## 2020-07-09 DIAGNOSIS — Z34.02 ENCOUNTER FOR PRENATAL CARE IN SECOND TRIMESTER OF FIRST PREGNANCY: Primary | ICD-10-CM

## 2020-07-09 LAB
GLUCOSE 1H P 50 G GLC PO SERPL-MCNC: 130 MG/DL (ref 60–129)
HGB BLD-MCNC: 12.4 G/DL (ref 11.7–15.7)

## 2020-07-09 PROCEDURE — 36415 COLL VENOUS BLD VENIPUNCTURE: CPT | Performed by: OBSTETRICS & GYNECOLOGY

## 2020-07-09 PROCEDURE — 82950 GLUCOSE TEST: CPT | Performed by: OBSTETRICS & GYNECOLOGY

## 2020-07-09 PROCEDURE — 76815 OB US LIMITED FETUS(S): CPT | Performed by: OBSTETRICS & GYNECOLOGY

## 2020-07-09 PROCEDURE — 99207 ZZC PRENATAL VISIT: CPT | Performed by: OBSTETRICS & GYNECOLOGY

## 2020-07-09 PROCEDURE — 00000218 ZZHCL STATISTIC OBHBG - HEMOGLOBIN: Performed by: OBSTETRICS & GYNECOLOGY

## 2020-07-09 NOTE — PROGRESS NOTES
24w1d feeling good, no c/o.  Feeling lots of mvmt.  Had repeat us today, placenta no longer previa, 3cm from os.  Discussed.  Gained 9+lbs so far, but always has a hard time gaining weight, so concerned about that.  I reassured her that I am not concerned at this point, will monitor and if not gaining enough, will get growth scan.  glucola today.    Phone visit in 4 weeks, in office in 8  jlp

## 2020-07-16 DIAGNOSIS — R73.09 ELEVATED GLUCOSE TOLERANCE TEST: ICD-10-CM

## 2020-07-16 LAB
GLUCOSE 1H P 100 G GLC PO SERPL-MCNC: 133 MG/DL (ref 60–179)
GLUCOSE 2H P 100 G GLC PO SERPL-MCNC: 84 MG/DL (ref 60–154)
GLUCOSE 3H P 100 G GLC PO SERPL-MCNC: 35 MG/DL (ref 60–139)
GLUCOSE P FAST SERPL-MCNC: 72 MG/DL (ref 60–94)

## 2020-07-16 PROCEDURE — 82951 GLUCOSE TOLERANCE TEST (GTT): CPT | Performed by: OBSTETRICS & GYNECOLOGY

## 2020-07-16 PROCEDURE — 36415 COLL VENOUS BLD VENIPUNCTURE: CPT | Performed by: OBSTETRICS & GYNECOLOGY

## 2020-07-16 PROCEDURE — 82952 GTT-ADDED SAMPLES: CPT | Performed by: OBSTETRICS & GYNECOLOGY

## 2020-08-06 ENCOUNTER — PRENATAL OFFICE VISIT (OUTPATIENT)
Dept: OBGYN | Facility: CLINIC | Age: 35
End: 2020-08-06
Payer: COMMERCIAL

## 2020-08-06 DIAGNOSIS — Z34.00 SUPERVISION OF NORMAL FIRST PREGNANCY, ANTEPARTUM: Primary | ICD-10-CM

## 2020-08-06 PROCEDURE — 99207 ZZC PRENATAL VISIT: CPT | Performed by: OBSTETRICS & GYNECOLOGY

## 2020-08-06 NOTE — PROGRESS NOTES
Phone visit for modified prenatal care for COVID.  Phone time 10 minutes.    Feels well, no complaints.  One hour glucose 130, all values normal in 3 hour (final value was 35, discussed, she had had no symptoms of hypoglycemia).  Planning peds care at a clinic near her house so is researching options, discussed availability of house pediatrician (and NICU if needed) for immediate post-delivery care.  Will plan Tdap at next visit, discussed.  RTC 4 weeks in office.  AM

## 2020-09-04 ENCOUNTER — PRENATAL OFFICE VISIT (OUTPATIENT)
Dept: OBGYN | Facility: CLINIC | Age: 35
End: 2020-09-04
Payer: COMMERCIAL

## 2020-09-04 VITALS
HEART RATE: 95 BPM | WEIGHT: 144 LBS | BODY MASS INDEX: 20.96 KG/M2 | DIASTOLIC BLOOD PRESSURE: 70 MMHG | SYSTOLIC BLOOD PRESSURE: 102 MMHG | OXYGEN SATURATION: 100 %

## 2020-09-04 DIAGNOSIS — Z34.00 SUPERVISION OF NORMAL FIRST PREGNANCY, ANTEPARTUM: Primary | ICD-10-CM

## 2020-09-04 DIAGNOSIS — Z23 NEED FOR TDAP VACCINATION: ICD-10-CM

## 2020-09-04 PROCEDURE — 90471 IMMUNIZATION ADMIN: CPT | Performed by: OBSTETRICS & GYNECOLOGY

## 2020-09-04 PROCEDURE — 90715 TDAP VACCINE 7 YRS/> IM: CPT | Performed by: OBSTETRICS & GYNECOLOGY

## 2020-09-04 PROCEDURE — 99207 ZZC PRENATAL VISIT: CPT | Performed by: OBSTETRICS & GYNECOLOGY

## 2020-09-04 NOTE — PROGRESS NOTES
32w2d feeling good, no c/o.  Baby moving a lot.  Weight gain still on the lower end, but ok and FH is normal.  Tdap today Plan phone visit in 2 weeks, in office in 4 then weekly  ayse

## 2020-09-11 ENCOUNTER — HOSPITAL ENCOUNTER (OUTPATIENT)
Facility: CLINIC | Age: 35
End: 2020-09-11
Payer: COMMERCIAL

## 2020-09-17 ENCOUNTER — PRENATAL OFFICE VISIT (OUTPATIENT)
Dept: OBGYN | Facility: CLINIC | Age: 35
End: 2020-09-17
Payer: COMMERCIAL

## 2020-09-17 DIAGNOSIS — Z34.00 SUPERVISION OF NORMAL FIRST PREGNANCY, ANTEPARTUM: Primary | ICD-10-CM

## 2020-09-17 PROCEDURE — 99207 ZZC PRENATAL VISIT: CPT | Performed by: OBSTETRICS & GYNECOLOGY

## 2020-09-17 NOTE — PROGRESS NOTES
Phone visit for modified prenatal care for COVID. Call time 10 minutes.  Fetus active.  Had some questions about L&D, recovery, standard labor practices.  GBS and BSUS next, RTC 2 weeks.  AM

## 2020-10-01 ENCOUNTER — PRENATAL OFFICE VISIT (OUTPATIENT)
Dept: OBGYN | Facility: CLINIC | Age: 35
End: 2020-10-01
Payer: COMMERCIAL

## 2020-10-01 VITALS
DIASTOLIC BLOOD PRESSURE: 64 MMHG | SYSTOLIC BLOOD PRESSURE: 91 MMHG | BODY MASS INDEX: 21.25 KG/M2 | WEIGHT: 146 LBS | HEART RATE: 62 BPM | TEMPERATURE: 98.1 F

## 2020-10-01 DIAGNOSIS — Z34.00 SUPERVISION OF NORMAL FIRST PREGNANCY, ANTEPARTUM: Primary | ICD-10-CM

## 2020-10-01 DIAGNOSIS — Z23 NEED FOR PROPHYLACTIC VACCINATION AND INOCULATION AGAINST INFLUENZA: ICD-10-CM

## 2020-10-01 LAB
CAPILLARY BLOOD COLLECTION: NORMAL
HGB BLD-MCNC: 13.4 G/DL (ref 11.7–15.7)

## 2020-10-01 PROCEDURE — 99207 PR PRENATAL VISIT: CPT | Performed by: OBSTETRICS & GYNECOLOGY

## 2020-10-01 PROCEDURE — 36416 COLLJ CAPILLARY BLOOD SPEC: CPT | Performed by: OBSTETRICS & GYNECOLOGY

## 2020-10-01 PROCEDURE — 87653 STREP B DNA AMP PROBE: CPT | Performed by: OBSTETRICS & GYNECOLOGY

## 2020-10-01 PROCEDURE — 90686 IIV4 VACC NO PRSV 0.5 ML IM: CPT | Performed by: OBSTETRICS & GYNECOLOGY

## 2020-10-01 PROCEDURE — 99N1025 PR STATISTIC OBHBG - HEMOGLOBIN: Performed by: OBSTETRICS & GYNECOLOGY

## 2020-10-01 PROCEDURE — 90471 IMMUNIZATION ADMIN: CPT | Performed by: OBSTETRICS & GYNECOLOGY

## 2020-10-01 NOTE — PROGRESS NOTES
36w1d feeling ok, no c/o.  Good fm.  No ctx that she can tell.  BSUS: cephalic.  Worried about a large baby as she was 9lb9oz and  was almost 11.  EFW today feels about 6.5, but she does carry into her back, so may be under-estimating.  Discussed.  Normal to roomy pelvis.  GBS today. Flu shot given.  RTC weekly  rodrickp

## 2020-10-03 LAB
GP B STREP DNA SPEC QL NAA+PROBE: NEGATIVE
SPECIMEN SOURCE: NORMAL

## 2020-10-07 ENCOUNTER — PRENATAL OFFICE VISIT (OUTPATIENT)
Dept: OBGYN | Facility: CLINIC | Age: 35
End: 2020-10-07
Payer: COMMERCIAL

## 2020-10-07 VITALS
DIASTOLIC BLOOD PRESSURE: 71 MMHG | HEART RATE: 84 BPM | BODY MASS INDEX: 21.35 KG/M2 | SYSTOLIC BLOOD PRESSURE: 108 MMHG | TEMPERATURE: 98.7 F | OXYGEN SATURATION: 100 % | WEIGHT: 146.7 LBS

## 2020-10-07 DIAGNOSIS — Z34.00 SUPERVISION OF NORMAL FIRST PREGNANCY, ANTEPARTUM: Primary | ICD-10-CM

## 2020-10-07 PROCEDURE — 99207 PR PRENATAL VISIT: CPT | Performed by: OBSTETRICS & GYNECOLOGY

## 2020-10-07 RX ORDER — LIDOCAINE 40 MG/G
CREAM TOPICAL
Status: CANCELLED | OUTPATIENT
Start: 2020-10-07

## 2020-10-07 RX ORDER — ACETAMINOPHEN 325 MG/1
650 TABLET ORAL EVERY 4 HOURS PRN
Status: CANCELLED | OUTPATIENT
Start: 2020-10-07

## 2020-10-07 RX ORDER — SODIUM CHLORIDE, SODIUM LACTATE, POTASSIUM CHLORIDE, CALCIUM CHLORIDE 600; 310; 30; 20 MG/100ML; MG/100ML; MG/100ML; MG/100ML
INJECTION, SOLUTION INTRAVENOUS CONTINUOUS
Status: CANCELLED | OUTPATIENT
Start: 2020-10-07

## 2020-10-07 RX ORDER — OXYTOCIN 10 [USP'U]/ML
10 INJECTION, SOLUTION INTRAMUSCULAR; INTRAVENOUS
Status: CANCELLED | OUTPATIENT
Start: 2020-10-07

## 2020-10-07 RX ORDER — FENTANYL CITRATE 50 UG/ML
50-100 INJECTION, SOLUTION INTRAMUSCULAR; INTRAVENOUS
Status: CANCELLED | OUTPATIENT
Start: 2020-10-07

## 2020-10-07 RX ORDER — NALOXONE HYDROCHLORIDE 0.4 MG/ML
.1-.4 INJECTION, SOLUTION INTRAMUSCULAR; INTRAVENOUS; SUBCUTANEOUS
Status: CANCELLED | OUTPATIENT
Start: 2020-10-07

## 2020-10-07 RX ORDER — OXYCODONE AND ACETAMINOPHEN 5; 325 MG/1; MG/1
1 TABLET ORAL
Status: CANCELLED | OUTPATIENT
Start: 2020-10-07

## 2020-10-07 RX ORDER — METHYLERGONOVINE MALEATE 0.2 MG/ML
200 INJECTION INTRAVENOUS
Status: CANCELLED | OUTPATIENT
Start: 2020-10-07

## 2020-10-07 RX ORDER — IBUPROFEN 200 MG
800 TABLET ORAL
Status: CANCELLED | OUTPATIENT
Start: 2020-10-07

## 2020-10-07 RX ORDER — CARBOPROST TROMETHAMINE 250 UG/ML
250 INJECTION, SOLUTION INTRAMUSCULAR
Status: CANCELLED | OUTPATIENT
Start: 2020-10-07

## 2020-10-07 RX ORDER — ONDANSETRON 2 MG/ML
4 INJECTION INTRAMUSCULAR; INTRAVENOUS EVERY 6 HOURS PRN
Status: CANCELLED | OUTPATIENT
Start: 2020-10-07

## 2020-10-07 RX ORDER — OXYTOCIN/0.9 % SODIUM CHLORIDE 30/500 ML
100-340 PLASTIC BAG, INJECTION (ML) INTRAVENOUS CONTINUOUS PRN
Status: CANCELLED | OUTPATIENT
Start: 2020-10-07

## 2020-10-07 NOTE — PROGRESS NOTES
Did not understand that residents would be doing the actual delivery so declines their involvement now. Otherwise feeling okay and answered questions about L&D and when to call. RTC weekly until delivery.  BE    GBS: Negative  Hemoglobin   Date Value Ref Range Status   10/01/2020 13.4 11.7 - 15.7 g/dL Final   ]    Breast pump rx: done  Labor orders: signed and held  Birth plan: epidural    Length of stay: discussed  Disability paperwork: NA  Resident involvement: discussed and declines.  Discharge meds done : OTC

## 2020-10-14 ENCOUNTER — PRENATAL OFFICE VISIT (OUTPATIENT)
Dept: OBGYN | Facility: CLINIC | Age: 35
End: 2020-10-14
Payer: COMMERCIAL

## 2020-10-14 VITALS
HEART RATE: 55 BPM | BODY MASS INDEX: 21.09 KG/M2 | HEIGHT: 70 IN | DIASTOLIC BLOOD PRESSURE: 77 MMHG | SYSTOLIC BLOOD PRESSURE: 105 MMHG | WEIGHT: 147.3 LBS | TEMPERATURE: 97.1 F

## 2020-10-14 DIAGNOSIS — Z34.03 ENCOUNTER FOR PRENATAL CARE IN THIRD TRIMESTER OF FIRST PREGNANCY: Primary | ICD-10-CM

## 2020-10-14 PROCEDURE — 99207 PR PRENATAL VISIT: CPT | Performed by: OBSTETRICS & GYNECOLOGY

## 2020-10-14 ASSESSMENT — MIFFLIN-ST. JEOR: SCORE: 1450.46

## 2020-10-14 NOTE — PATIENT INSTRUCTIONS
Patient Education     Labor: Preparing for the Hospital    During the final weeks of your pregnancy, you may have irregular contractions. You may feel a drop in your baby s position. And the profile of your stomach may look a little different. Because due dates are not exact, have your bag packed and ready for the hospital.  Packing for the hospital  Here are some items you may want to have ready for the hospital:    A watch or clock with a second hand    Insurance card and identification    Nursing bra, nursing pads, and maternity underwear    Toiletries    Something to entertain yourself, like books or a handheld computer    Heavy socks or slippers and a robe    Clips for your hair, a brush, and lip balm    An [employer] or other music player    A camera or video camera and     Important phone numbers or email addresses    Going-home outfits for you and your baby    A car seat to take your baby home in  Leaving for the hospital  Follow the instructions you ve been given on when to leave for the hospital. You may be told to call your healthcare provider when it becomes hard to walk or talk during contractions or if your amniotic sac breaks (this causes a gush of water). If your partner makes the phone call for you, be nearby. That way, your healthcare provider can speak to you directly. Many women are told to go to the hospital after an hour of contractions that come 3 to 5 minutes apart. Leave sooner if the hospital is not nearby or is hard to get to.  Date Last Reviewed: 1/1/2018 2000-2019 [employer]. [address] [place] [address], [place] [address]. All rights reserved. This information is not intended as a substitute for professional medical care. Always follow your healthcare professional's instructions.

## 2020-10-14 NOTE — PROGRESS NOTES
"[facility]- OBGYN    CC: routine prenatal visit    S:Jimena Irvin is a 34 year old  who presents today for routine prenatal visit.  Patient reports that she has been feeling well.  Patient denies any contractions, vaginal bleeding, leakage of fluid.  She states she is feeling normal fetal movement.      O:   Patient Vitals for the past 24 hrs:   BP Temp Temp src Pulse Height Weight   10/14/20 1644 105/77 97.1  F (36.2  C) Oral 55 1.765 m (5' 9.5\") 66.8 kg (147 lb 4.8 oz)   ]  Exam:  General- awake, alert, answering questions appropriately, appears comfortable  CV- regular rate  Lung- breathing comfortably on room air  Ext- bilateral lower extremities with no edema    Doptones- 144 bpm  Fundal height-term    A&P: Jimena Irvin is a 34 year old  who presents today for routine prenatal visit.     (Z34.03) Encounter for prenatal care in third trimester of first pregnancy  (primary encounter diagnosis)  Comment: Patient is doing well.  Has home meds.  Discussed s/sx of labor, SROM, and fetal kick counts. Discussed birth control options postpartum.  Patient considering Mirena IUD.   Plan: Next visit scheduled on 10/22/2020.    Marion Casuey MD      "

## 2020-10-22 ENCOUNTER — PRENATAL OFFICE VISIT (OUTPATIENT)
Dept: OBGYN | Facility: CLINIC | Age: 35
End: 2020-10-22
Payer: COMMERCIAL

## 2020-10-22 VITALS
HEART RATE: 103 BPM | DIASTOLIC BLOOD PRESSURE: 75 MMHG | BODY MASS INDEX: 21.54 KG/M2 | TEMPERATURE: 96.9 F | SYSTOLIC BLOOD PRESSURE: 108 MMHG | WEIGHT: 148 LBS

## 2020-10-22 DIAGNOSIS — Z34.03 ENCOUNTER FOR PRENATAL CARE IN THIRD TRIMESTER OF FIRST PREGNANCY: Primary | ICD-10-CM

## 2020-10-22 PROCEDURE — 99207 PR PRENATAL VISIT: CPT | Performed by: OBSTETRICS & GYNECOLOGY

## 2020-10-22 NOTE — PROGRESS NOTES
[facility]- OBGYN    CC: routine prenatal visit.    S:Jimena Irvin is a 34 year old  at 39w1d who presents today for routine prenatal visit.  Patient reports some lower back pain that worsened after walking on the treadmill 45 minutes uphill.  Patient denies any contractions, vaginal bleeding, leakage of fluid.  She states she is feeling normal fetal movement.     O:   Patient Vitals for the past 24 hrs:   BP Temp Temp src Pulse Weight   10/22/20 1542 108/75 96.9  F (36.1  C) Oral 103 67.1 kg (148 lb)   ]  Exam:  General- awake, alert, answering questions appropriately, appears comfortable  CV- regular rate  Lung- breathing comfortably on room air  Ext- bilateral lower extremities with no edema  Cervix- closed/0/-3/posterior/medium    Doptones- 144 bpm    A&P: Jimena Irvin is a 34 year old  at 39w1d who presents today for routine prenatal visit.    (Z34.03) Encounter for prenatal care in third trimester of first pregnancy  (primary encounter diagnosis)  Comment: Doing well.  Cervix is closed.  Reviewed s/sx of labor, rupture of membranes, kick counts   Plan: Next visit in person with Dr. Schaefer on 10/28/2020    Marion Causey MD

## 2020-10-22 NOTE — PATIENT INSTRUCTIONS
Patient Education     Labor: Preparing for the Hospital    During the final weeks of your pregnancy, you may have irregular contractions. You may feel a drop in your baby s position. And the profile of your stomach may look a little different. Because due dates are not exact, have your bag packed and ready for the hospital.  Packing for the hospital  Here are some items you may want to have ready for the hospital:    A watch or clock with a second hand    Insurance card and identification    Nursing bra, nursing pads, and maternity underwear    Toiletries    Something to entertain yourself, like books or a handheld computer    Heavy socks or slippers and a robe    Clips for your hair, a brush, and lip balm    An [employer] or other music player    A camera or video camera and     Important phone numbers or email addresses    Going-home outfits for you and your baby    A car seat to take your baby home in  Leaving for the hospital  Follow the instructions you ve been given on when to leave for the hospital. You may be told to call your healthcare provider when it becomes hard to walk or talk during contractions or if your amniotic sac breaks (this causes a gush of water). If your partner makes the phone call for you, be nearby. That way, your healthcare provider can speak to you directly. Many women are told to go to the hospital after an hour of contractions that come 3 to 5 minutes apart. Leave sooner if the hospital is not nearby or is hard to get to.  Date Last Reviewed: 1/1/2018 2000-2019 [employer]. [address] [place] [address], [place] [address]. All rights reserved. This information is not intended as a substitute for professional medical care. Always follow your healthcare professional's instructions.

## 2020-10-24 ENCOUNTER — NURSE TRIAGE (OUTPATIENT)
Dept: NURSING | Facility: CLINIC | Age: 35
End: 2020-10-24

## 2020-10-25 ENCOUNTER — ANESTHESIA EVENT (OUTPATIENT)
Dept: OBGYN | Facility: CLINIC | Age: 35
End: 2020-10-25
Payer: COMMERCIAL

## 2020-10-25 ENCOUNTER — ANESTHESIA (OUTPATIENT)
Dept: OBGYN | Facility: CLINIC | Age: 35
End: 2020-10-25
Payer: COMMERCIAL

## 2020-10-25 ENCOUNTER — HOSPITAL ENCOUNTER (INPATIENT)
Facility: CLINIC | Age: 35
LOS: 1 days | Discharge: HOME | End: 2020-10-26
Attending: OBSTETRICS & GYNECOLOGY | Admitting: OBSTETRICS & GYNECOLOGY
Payer: COMMERCIAL

## 2020-10-25 PROBLEM — Z34.90 TERM PREGNANCY: Status: ACTIVE | Noted: 2020-10-25

## 2020-10-25 PROBLEM — Z36.89 ENCOUNTER FOR TRIAGE IN PREGNANT PATIENT: Status: ACTIVE | Noted: 2020-10-25

## 2020-10-25 LAB
ABO + RH BLD: NORMAL
ABO + RH BLD: NORMAL
BASOPHILS # BLD AUTO: 0.1 10E9/L (ref 0–0.2)
BASOPHILS NFR BLD AUTO: 0.6 %
BLD GP AB SCN SERPL QL: NORMAL
BLOOD BANK CMNT PATIENT-IMP: NORMAL
DIFFERENTIAL METHOD BLD: ABNORMAL
EOSINOPHIL # BLD AUTO: 0.1 10E9/L (ref 0–0.7)
EOSINOPHIL NFR BLD AUTO: 0.6 %
ERYTHROCYTE [DISTWIDTH] IN BLOOD BY AUTOMATED COUNT: 12.6 % (ref 10–15)
HCT VFR BLD AUTO: 38.6 % (ref 35–47)
HGB BLD-MCNC: 12.7 G/DL (ref 11.7–15.7)
IMM GRANULOCYTES # BLD: 0 10E9/L (ref 0–0.4)
IMM GRANULOCYTES NFR BLD: 0.5 %
LABORATORY COMMENT REPORT: NORMAL
LYMPHOCYTES # BLD AUTO: 1.6 10E9/L (ref 0.8–5.3)
LYMPHOCYTES NFR BLD AUTO: 18.5 %
MCH RBC QN AUTO: 31.8 PG (ref 26.5–33)
MCHC RBC AUTO-ENTMCNC: 32.9 G/DL (ref 31.5–36.5)
MCV RBC AUTO: 97 FL (ref 78–100)
MONOCYTES # BLD AUTO: 0.6 10E9/L (ref 0–1.3)
MONOCYTES NFR BLD AUTO: 6.7 %
NEUTROPHILS # BLD AUTO: 6.4 10E9/L (ref 1.6–8.3)
NEUTROPHILS NFR BLD AUTO: 73.1 %
NRBC # BLD AUTO: 0 10*3/UL
NRBC BLD AUTO-RTO: 0 /100
PLATELET # BLD AUTO: 100 10E9/L (ref 150–450)
RBC # BLD AUTO: 4 10E12/L (ref 3.8–5.2)
RUPTURE OF FETAL MEMBRANES BY ROM PLUS: POSITIVE
SARS-COV-2 RNA SPEC QL NAA+PROBE: NEGATIVE
SARS-COV-2 RNA SPEC QL NAA+PROBE: NORMAL
SPECIMEN EXP DATE BLD: NORMAL
SPECIMEN SOURCE: NORMAL
SPECIMEN SOURCE: NORMAL
T PALLIDUM AB SER QL: NONREACTIVE
WBC # BLD AUTO: 8.7 10E9/L (ref 4–11)

## 2020-10-25 PROCEDURE — U0003 INFECTIOUS AGENT DETECTION BY NUCLEIC ACID (DNA OR RNA); SEVERE ACUTE RESPIRATORY SYNDROME CORONAVIRUS 2 (SARS-COV-2) (CORONAVIRUS DISEASE [COVID-19]), AMPLIFIED PROBE TECHNIQUE, MAKING USE OF HIGH THROUGHPUT TECHNOLOGIES AS DESCRIBED BY CMS-2020-01-R: HCPCS | Performed by: OBSTETRICS & GYNECOLOGY

## 2020-10-25 PROCEDURE — 84112 EVAL AMNIOTIC FLUID PROTEIN: CPT | Performed by: OBSTETRICS & GYNECOLOGY

## 2020-10-25 PROCEDURE — 250N000009 HC RX 250: Performed by: OBSTETRICS & GYNECOLOGY

## 2020-10-25 PROCEDURE — 258N000003 HC RX IP 258 OP 636: Performed by: OBSTETRICS & GYNECOLOGY

## 2020-10-25 PROCEDURE — 722N000001 HC LABOR CARE VAGINAL DELIVERY SINGLE

## 2020-10-25 PROCEDURE — 120N000002 HC R&B MED SURG/OB UMMC

## 2020-10-25 PROCEDURE — G0463 HOSPITAL OUTPT CLINIC VISIT: HCPCS

## 2020-10-25 PROCEDURE — 250N000013 HC RX MED GY IP 250 OP 250 PS 637: Performed by: OBSTETRICS & GYNECOLOGY

## 2020-10-25 PROCEDURE — 85025 COMPLETE CBC W/AUTO DIFF WBC: CPT | Performed by: OBSTETRICS & GYNECOLOGY

## 2020-10-25 PROCEDURE — 0UQMXZZ REPAIR VULVA, EXTERNAL APPROACH: ICD-10-PCS | Performed by: OBSTETRICS & GYNECOLOGY

## 2020-10-25 PROCEDURE — 250N000009 HC RX 250

## 2020-10-25 PROCEDURE — 36415 COLL VENOUS BLD VENIPUNCTURE: CPT | Performed by: OBSTETRICS & GYNECOLOGY

## 2020-10-25 PROCEDURE — 00HU33Z INSERTION OF INFUSION DEVICE INTO SPINAL CANAL, PERCUTANEOUS APPROACH: ICD-10-PCS | Performed by: ANESTHESIOLOGY

## 2020-10-25 PROCEDURE — 250N000011 HC RX IP 250 OP 636: Performed by: ANESTHESIOLOGY

## 2020-10-25 PROCEDURE — 3E0R3BZ INTRODUCTION OF ANESTHETIC AGENT INTO SPINAL CANAL, PERCUTANEOUS APPROACH: ICD-10-PCS | Performed by: ANESTHESIOLOGY

## 2020-10-25 PROCEDURE — 86780 TREPONEMA PALLIDUM: CPT | Performed by: OBSTETRICS & GYNECOLOGY

## 2020-10-25 PROCEDURE — 86901 BLOOD TYPING SEROLOGIC RH(D): CPT | Performed by: OBSTETRICS & GYNECOLOGY

## 2020-10-25 PROCEDURE — 86850 RBC ANTIBODY SCREEN: CPT | Performed by: OBSTETRICS & GYNECOLOGY

## 2020-10-25 PROCEDURE — 0KQM0ZZ REPAIR PERINEUM MUSCLE, OPEN APPROACH: ICD-10-PCS | Performed by: OBSTETRICS & GYNECOLOGY

## 2020-10-25 PROCEDURE — 250N000009 HC RX 250: Performed by: ANESTHESIOLOGY

## 2020-10-25 PROCEDURE — 59400 OBSTETRICAL CARE: CPT | Performed by: OBSTETRICS & GYNECOLOGY

## 2020-10-25 PROCEDURE — 86900 BLOOD TYPING SEROLOGIC ABO: CPT | Performed by: OBSTETRICS & GYNECOLOGY

## 2020-10-25 PROCEDURE — 999N000016 HC STATISTIC ATTENDANCE AT DELIVERY

## 2020-10-25 RX ORDER — IBUPROFEN 800 MG/1
800 TABLET, FILM COATED ORAL EVERY 6 HOURS PRN
Status: DISCONTINUED | OUTPATIENT
Start: 2020-10-25 | End: 2020-10-26 | Stop reason: HOSPADM

## 2020-10-25 RX ORDER — AMOXICILLIN 250 MG
2 CAPSULE ORAL 2 TIMES DAILY
Status: DISCONTINUED | OUTPATIENT
Start: 2020-10-25 | End: 2020-10-26 | Stop reason: HOSPADM

## 2020-10-25 RX ORDER — CARBOPROST TROMETHAMINE 250 UG/ML
250 INJECTION, SOLUTION INTRAMUSCULAR
Status: DISCONTINUED | OUTPATIENT
Start: 2020-10-25 | End: 2020-10-26

## 2020-10-25 RX ORDER — IBUPROFEN 800 MG/1
800 TABLET, FILM COATED ORAL
Status: DISCONTINUED | OUTPATIENT
Start: 2020-10-25 | End: 2020-10-26 | Stop reason: HOSPADM

## 2020-10-25 RX ORDER — ONDANSETRON 2 MG/ML
4 INJECTION INTRAMUSCULAR; INTRAVENOUS EVERY 6 HOURS PRN
Status: DISCONTINUED | OUTPATIENT
Start: 2020-10-25 | End: 2020-10-26

## 2020-10-25 RX ORDER — MISOPROSTOL 200 UG/1
400 TABLET ORAL
Status: DISCONTINUED | OUTPATIENT
Start: 2020-10-25 | End: 2020-10-26 | Stop reason: HOSPADM

## 2020-10-25 RX ORDER — ACETAMINOPHEN 325 MG/1
650 TABLET ORAL EVERY 4 HOURS PRN
Status: DISCONTINUED | OUTPATIENT
Start: 2020-10-25 | End: 2020-10-26 | Stop reason: HOSPADM

## 2020-10-25 RX ORDER — BISACODYL 10 MG
10 SUPPOSITORY, RECTAL RECTAL DAILY PRN
Status: DISCONTINUED | OUTPATIENT
Start: 2020-10-27 | End: 2020-10-26 | Stop reason: HOSPADM

## 2020-10-25 RX ORDER — FENTANYL CITRATE 50 UG/ML
50-100 INJECTION, SOLUTION INTRAMUSCULAR; INTRAVENOUS
Status: DISCONTINUED | OUTPATIENT
Start: 2020-10-25 | End: 2020-10-26

## 2020-10-25 RX ORDER — ACETAMINOPHEN 325 MG/1
650 TABLET ORAL EVERY 4 HOURS PRN
Status: DISCONTINUED | OUTPATIENT
Start: 2020-10-25 | End: 2020-10-26

## 2020-10-25 RX ORDER — LIDOCAINE HYDROCHLORIDE AND EPINEPHRINE 15; 5 MG/ML; UG/ML
INJECTION, SOLUTION EPIDURAL PRN
Status: DISCONTINUED | OUTPATIENT
Start: 2020-10-25 | End: 2022-03-03

## 2020-10-25 RX ORDER — OXYTOCIN/0.9 % SODIUM CHLORIDE 30/500 ML
PLASTIC BAG, INJECTION (ML) INTRAVENOUS
Status: COMPLETED
Start: 2020-10-25 | End: 2020-10-25

## 2020-10-25 RX ORDER — OXYCODONE AND ACETAMINOPHEN 5; 325 MG/1; MG/1
1 TABLET ORAL
Status: DISCONTINUED | OUTPATIENT
Start: 2020-10-25 | End: 2020-10-26

## 2020-10-25 RX ORDER — OXYTOCIN/0.9 % SODIUM CHLORIDE 30/500 ML
100 PLASTIC BAG, INJECTION (ML) INTRAVENOUS CONTINUOUS
Status: DISCONTINUED | OUTPATIENT
Start: 2020-10-25 | End: 2020-10-26 | Stop reason: HOSPADM

## 2020-10-25 RX ORDER — AMOXICILLIN 250 MG
1 CAPSULE ORAL 2 TIMES DAILY
Status: DISCONTINUED | OUTPATIENT
Start: 2020-10-25 | End: 2020-10-26 | Stop reason: HOSPADM

## 2020-10-25 RX ORDER — HYDROCORTISONE 2.5 %
CREAM (GRAM) TOPICAL 3 TIMES DAILY PRN
Status: DISCONTINUED | OUTPATIENT
Start: 2020-10-25 | End: 2020-10-26 | Stop reason: HOSPADM

## 2020-10-25 RX ORDER — OXYTOCIN/0.9 % SODIUM CHLORIDE 30/500 ML
340 PLASTIC BAG, INJECTION (ML) INTRAVENOUS CONTINUOUS PRN
Status: DISCONTINUED | OUTPATIENT
Start: 2020-10-25 | End: 2020-10-26 | Stop reason: HOSPADM

## 2020-10-25 RX ORDER — NALOXONE HYDROCHLORIDE 0.4 MG/ML
.1-.4 INJECTION, SOLUTION INTRAMUSCULAR; INTRAVENOUS; SUBCUTANEOUS
Status: DISCONTINUED | OUTPATIENT
Start: 2020-10-25 | End: 2020-10-26 | Stop reason: HOSPADM

## 2020-10-25 RX ORDER — OXYTOCIN/0.9 % SODIUM CHLORIDE 30/500 ML
100-340 PLASTIC BAG, INJECTION (ML) INTRAVENOUS CONTINUOUS PRN
Status: COMPLETED | OUTPATIENT
Start: 2020-10-25 | End: 2020-10-25

## 2020-10-25 RX ORDER — SODIUM CHLORIDE, SODIUM LACTATE, POTASSIUM CHLORIDE, CALCIUM CHLORIDE 600; 310; 30; 20 MG/100ML; MG/100ML; MG/100ML; MG/100ML
INJECTION, SOLUTION INTRAVENOUS CONTINUOUS
Status: DISCONTINUED | OUTPATIENT
Start: 2020-10-25 | End: 2020-10-26

## 2020-10-25 RX ORDER — MODIFIED LANOLIN
OINTMENT (GRAM) TOPICAL
Status: DISCONTINUED | OUTPATIENT
Start: 2020-10-25 | End: 2020-10-26 | Stop reason: HOSPADM

## 2020-10-25 RX ORDER — IBUPROFEN 800 MG/1
800 TABLET, FILM COATED ORAL ONCE
Status: COMPLETED | OUTPATIENT
Start: 2020-10-25 | End: 2020-10-25

## 2020-10-25 RX ORDER — MISOPROSTOL 200 UG/1
TABLET ORAL
Status: DISCONTINUED
Start: 2020-10-25 | End: 2020-10-25 | Stop reason: WASHOUT

## 2020-10-25 RX ORDER — LIDOCAINE 40 MG/G
CREAM TOPICAL
Status: DISCONTINUED | OUTPATIENT
Start: 2020-10-25 | End: 2020-10-26

## 2020-10-25 RX ORDER — OXYTOCIN 10 [USP'U]/ML
10 INJECTION, SOLUTION INTRAMUSCULAR; INTRAVENOUS
Status: DISCONTINUED | OUTPATIENT
Start: 2020-10-25 | End: 2020-10-26 | Stop reason: HOSPADM

## 2020-10-25 RX ORDER — EPHEDRINE SULFATE 50 MG/ML
5 INJECTION, SOLUTION INTRAMUSCULAR; INTRAVENOUS; SUBCUTANEOUS
Status: DISCONTINUED | OUTPATIENT
Start: 2020-10-25 | End: 2020-10-26 | Stop reason: HOSPADM

## 2020-10-25 RX ORDER — NALBUPHINE HYDROCHLORIDE 20 MG/ML
2.5-5 INJECTION, SOLUTION INTRAMUSCULAR; INTRAVENOUS; SUBCUTANEOUS EVERY 6 HOURS PRN
Status: DISCONTINUED | OUTPATIENT
Start: 2020-10-25 | End: 2020-10-26 | Stop reason: HOSPADM

## 2020-10-25 RX ORDER — OXYTOCIN/0.9 % SODIUM CHLORIDE 30/500 ML
1-24 PLASTIC BAG, INJECTION (ML) INTRAVENOUS CONTINUOUS
Status: DISCONTINUED | OUTPATIENT
Start: 2020-10-25 | End: 2020-10-26

## 2020-10-25 RX ORDER — OXYTOCIN 10 [USP'U]/ML
10 INJECTION, SOLUTION INTRAMUSCULAR; INTRAVENOUS
Status: DISCONTINUED | OUTPATIENT
Start: 2020-10-25 | End: 2020-10-26

## 2020-10-25 RX ORDER — LIDOCAINE HYDROCHLORIDE 10 MG/ML
INJECTION, SOLUTION EPIDURAL; INFILTRATION; INTRACAUDAL; PERINEURAL
Status: COMPLETED
Start: 2020-10-25 | End: 2020-10-25

## 2020-10-25 RX ORDER — NALOXONE HYDROCHLORIDE 0.4 MG/ML
.1-.4 INJECTION, SOLUTION INTRAMUSCULAR; INTRAVENOUS; SUBCUTANEOUS
Status: DISCONTINUED | OUTPATIENT
Start: 2020-10-25 | End: 2020-10-26

## 2020-10-25 RX ORDER — METHYLERGONOVINE MALEATE 0.2 MG/ML
200 INJECTION INTRAVENOUS
Status: DISCONTINUED | OUTPATIENT
Start: 2020-10-25 | End: 2020-10-26

## 2020-10-25 RX ADMIN — Medication: at 13:31

## 2020-10-25 RX ADMIN — Medication 10 ML/HR: at 06:36

## 2020-10-25 RX ADMIN — Medication: at 06:39

## 2020-10-25 RX ADMIN — IBUPROFEN 800 MG: 800 TABLET ORAL at 22:42

## 2020-10-25 RX ADMIN — Medication 2 MILLI-UNITS/MIN: at 13:32

## 2020-10-25 RX ADMIN — SODIUM CHLORIDE, POTASSIUM CHLORIDE, SODIUM LACTATE AND CALCIUM CHLORIDE: 600; 310; 30; 20 INJECTION, SOLUTION INTRAVENOUS at 07:21

## 2020-10-25 RX ADMIN — DOCUSATE SODIUM AND SENNOSIDES 1 TABLET: 8.6; 5 TABLET ORAL at 20:07

## 2020-10-25 RX ADMIN — SODIUM CHLORIDE, POTASSIUM CHLORIDE, SODIUM LACTATE AND CALCIUM CHLORIDE: 600; 310; 30; 20 INJECTION, SOLUTION INTRAVENOUS at 14:23

## 2020-10-25 RX ADMIN — LIDOCAINE HYDROCHLORIDE,EPINEPHRINE BITARTRATE 3 ML: 15; .005 INJECTION, SOLUTION EPIDURAL; INFILTRATION; INTRACAUDAL; PERINEURAL at 06:35

## 2020-10-25 RX ADMIN — LIDOCAINE HYDROCHLORIDE 300 MG: 10 INJECTION, SOLUTION EPIDURAL; INFILTRATION; INTRACAUDAL; PERINEURAL at 15:50

## 2020-10-25 RX ADMIN — IBUPROFEN 800 MG: 800 TABLET ORAL at 17:08

## 2020-10-25 RX ADMIN — SODIUM CHLORIDE, POTASSIUM CHLORIDE, SODIUM LACTATE AND CALCIUM CHLORIDE 1000 ML: 600; 310; 30; 20 INJECTION, SOLUTION INTRAVENOUS at 06:38

## 2020-10-25 RX ADMIN — Medication 340 ML/HR: at 15:44

## 2020-10-25 RX ADMIN — Medication 7 ML: at 06:35

## 2020-10-25 ASSESSMENT — ENCOUNTER SYMPTOMS: SEIZURES: 0

## 2020-10-25 ASSESSMENT — MIFFLIN-ST. JEOR: SCORE: 1461.57

## 2020-10-25 NOTE — PLAN OF CARE
Data: Patient presented to [facility] at 0214.   Reason for maternal/fetal assessment per patient is Rule out rupture of membranes  .  Patient is a . Prenatal record reviewed.      OB History    Para Term  AB Living   1 0 0 0 0 0   SAB TAB Ectopic Multiple Live Births   0 0 0 0 0      # Outcome Date GA Lbr Juan/2nd Weight Sex Delivery Anes PTL Lv   1 Current            . Medical history:   Past Medical History:   Diagnosis Date    Acne     minocycline    Chickenpox     Raynauds syndrome     has seen Dr. Zamora.    . Gestational Age 39w4d. VSS. Fetal movement present. Patient denies backache, pelvic pressure, UTI symptoms, GI problems, bloody show, vaginal bleeding, edema, headache, visual disturbances, epigastric or URQ pain, abdominal pain. Support person is present.  Action: Verbal consent for EFM. Triage assessment completed. Fetal assessment: Presumed adequate fetal oxygenation documented (see flow record).   Response: Dr. Peres informed of arrival and positive ROM+. Plan per provider is admission for delivery. Patient verbalized agreement with plan. Patient transferred to room 442 ambulatory, oriented to room and call light.

## 2020-10-25 NOTE — ANESTHESIA PROCEDURE NOTES
Epidural Procedure Note      Staff -   Anesthesiologist:  Denny Lafleur DO  Performed By: anesthesiologist    Location: OB     Procedure start time:  10/25/2020 6:19 AM     Procedure end time:  10/25/2020 6:40 AM   Pre-procedure checklist:   patient identified, IV checked, site marked, risks and benefits discussed, informed consent, monitors and equipment checked, pre-op evaluation, at physician/surgeon's request and post-op pain management      Correct Patient: Yes      Correct Position: Yes      Correct Site: Yes      Correct Procedure: Yes      Correct Laterality:  Yes    Site Marked:  Yes  Procedure:     Procedure:  Epidural catheter    ASA:  2    Diagnosis:  Labor    Position:  Sitting    Sterile Prep: chloraprep, mask, sterile gloves and patient draped      Insertion site:  L3-4    Local skin infiltration:  1% lidocaine    amount (mL):  2    Approach:  Midline    Needle gauge (G):  17    Needle Length (in):  3.5    Block Needle Type:  Touhy    Injection Technique:  LORT saline    JACY at (cm):  5    Attempts:  1    Redirects:  0    Catheter gauge (G):  19    Catheter threaded easily: Yes      Threaded to cm at skin:  10    Threaded in epidural space (cm):  5    Paresthesias:  No    Aspiration negative for Heme or CSF: Yes      Test dose (mL):  3     Local anesthetic:  Lidocaine 1.5% w/ 1:200,000 epinephrine    Test dose negative for signs of intravascular, subdural or intrathecal injection: Yes    Assessment/Narrative:      Informed consent obtained.  All risks and benefits of the epidural/spinal discussed with the patient.  All questions answered and all parties agreed with the plan.

## 2020-10-25 NOTE — PLAN OF CARE
Data: Postpartum checks WNL. VSS. Jimena Irvin transferred to 1845 via wheelchair at 1825. Baby transferred via parent's arms.  Action: Receiving unit notified of transfer: Yes. Patient and family notified of room change. Report given to BOBBY Coulter, at 1830. Belongings sent to receiving unit. Accompanied by Registered Nurse. Oriented patient to surroundings. Call light within reach. ID bands double-checked with receiving RN.  Response: Patient tolerated transfer and is stable.

## 2020-10-25 NOTE — TELEPHONE ENCOUNTER
Caller is pregnant and PATRICIA is 10/28/20. Caller is complaining of leakage of fluid. Caller states she is having a few contractions. Triage guidelines recommend to go to labor and delivery. Caller verbalized and understands directives. Triager called out to [facility] labor and delivery unit, triager informed charge nurse that patient was advised to be seen.   COVID 19 Nurse Triage Plan/Patient Instructions    Please be aware that novel coronavirus (COVID-19) may be circulating in the community. If you develop symptoms such as fever, cough, or SOB or if you have concerns about the presence of another infection including coronavirus (COVID-19), please contact your health care provider or visit [email or web address].     Disposition/Instructions    In-Person Visit with provider recommended. Reference Visit Selection Guide.    Thank you for taking steps to prevent the spread of this virus.  o Limit your contact with others.  o Wear a simple mask to cover your cough.  o Wash your hands well and often.    Resources    [facility]: About COVID-19: [email or web address]    CDC: What to Do If You're Sick: www.cdc.gov/coronavirus/2019-ncov/about/steps-when-sick.html    CDC: Ending Home Isolation: www.cdc.gov/coronavirus/2019-ncov/hcp/disposition-in-home-patients.html     CDC: Caring for Someone: www.cdc.gov/coronavirus/2019-ncov/if-you-are-sick/care-for-someone.html     [facility]: Interim Guidance for Hospital Discharge to Home: www.health.[facility].[place].us/diseases/coronavirus/hcp/hospdischarge.pdf    [facility] clinical trials (COVID-19 research studies): clinicalaffairs.[facility]-clinical-trials     Below are the COVID-19 hotlines at the [facility] ([facility]). Interpreters are available.   o For health questions: Call 190-919-5154 or 1-280.445.3723 (7 a.m. to 7 p.m.)  o For questions about schools and childcare: Call 516-284-1957 or 1-342.251.8527 (7 a.m. to 7 p.m.)                     Reason for  "Disposition    Leakage of fluid from vagina    Additional Information    Negative: [1] SEVERE abdominal pain (e.g., excruciating) AND [2] constant AND [3] present > 1 hour    Negative: Severe bleeding (e.g., continuous red blood from vagina, or large blood clots)    Negative: Umbilical cord hanging out of the vagina (shiny, white, curled appearance, \"like telephone cord\")    Negative: Uncontrollable urge to push (i.e., feels like baby is coming out now)    Negative: Can see baby    Negative: Sounds like a life-threatening emergency to the triager    Negative: < 20 weeks pregnant    Negative: Vaginal bleeding    Protocols used: PREGNANCY - RUPTURE OF [id number]      "

## 2020-10-25 NOTE — PLAN OF CARE
Pt states that she is no longer tolerating pain and requests epidural. IV bolus started. Anesthesia called to bedside. Informed consent obtained. Pt positioned and epidural placed. Pt reports relief

## 2020-10-25 NOTE — ANESTHESIA PREPROCEDURE EVALUATION
"Anesthesia Pre-Procedure Evaluation    Patient: Jimena Irvin   MRN:     [id number] Gender:   female   Age:    34 year old :      1985        Preoperative Diagnosis: * No surgery found *        LABS:  CBC:   Lab Results   Component Value Date    WBC 8.7 10/25/2020    WBC 8.4 2020    HGB 12.7 10/25/2020    HGB 13.4 10/01/2020    HCT 38.6 10/25/2020    HCT 42.1 2020     (L) 10/25/2020     2020     BMP: No results found for: NA, POTASSIUM, CHLORIDE, CO2, BUN, CR, GLC  COAGS: No results found for: PTT, INR, FIBR  POC: No results found for: BGM, HCG, HCGS  OTHER:   Lab Results   Component Value Date    CRP <5.0 2012    SED 5 2012        Preop Vitals    BP Readings from Last 3 Encounters:   10/25/20 115/78   10/22/20 108/75   10/14/20 105/77    Pulse Readings from Last 3 Encounters:   10/25/20 66   10/22/20 103   10/14/20 55      Resp Readings from Last 3 Encounters:   10/25/20 16    SpO2 Readings from Last 3 Encounters:   10/07/20 100%   20 100%   19 98%      Temp Readings from Last 1 Encounters:   10/25/20 36.7  C (98.1  F) (Oral)    Ht Readings from Last 1 Encounters:   10/25/20 1.778 m (5' 10\")      Wt Readings from Last 1 Encounters:   10/25/20 67.1 kg (148 lb)    Estimated body mass index is 21.24 kg/m  as calculated from the following:    Height as of this encounter: 1.778 m (5' 10\").    Weight as of this encounter: 67.1 kg (148 lb).     LDA:  Peripheral IV 10/25/20 Left Lower forearm (Active)   Number of days: 0        Past Medical History:   Diagnosis Date     Acne     minocycline     Chickenpox      Raynauds syndrome     has seen Dr. Zamora.       Past Surgical History:   Procedure Laterality Date     MOUTH SURGERY      wisdom teeth      Allergies   Allergen Reactions     Nkda [No Known Drug Allergies]         Anesthesia Evaluation       history and physical reviewed .      No history of anesthetic complications          ROS/MED HX    ENT/Pulmonary:  " - neg pulmonary ROS     Neurologic:  - neg neurologic ROS    (-) seizures   Cardiovascular:  - neg cardiovascular ROS       METS/Exercise Tolerance:     Hematologic:         Musculoskeletal:         GI/Hepatic:  - neg GI/hepatic ROS      (-) GERD   Renal/Genitourinary:         Endo:         Psychiatric:         Infectious Disease:         Malignancy:         Other:                     JZG FV AN PHYSICAL EXAM    JZG FV AN PLAN NO PONV RULE    neg OB ROS  (-) no pre-eclampsia           Denny Lafleur DO

## 2020-10-25 NOTE — H&P
[facility], [place]    History and Physical  Obstetrics and Gynecology     Date of Admission:  10/25/2020   Date of Service (when I saw the patient): 10/25/20      ASSESSMENT:   Jimena Irvin is a 34 year old  at 39w4d who presents in early labor with ruptured membranes.  NST reactive.  Category  I  GBS negative  Rh positive  Rubella immune    PLAN:   Admit - see IP orders  Planning epidural  Anticipate     Lucinda Peres MD      History of Present Illness  Jimena Irvin is a 34 year old  at 39w4d  Estimated Date of Delivery: Oct 28, 2020 by LMP. Patient's last menstrual period was 2020. Jimena  [facility] with ruptured membranes. Had a small contraction at 2330, followed by a gush of fluid. Leaking clear fluid since then. Contractions now every 3-5 min, mildly painful.  Contractions: q 3-4 minutes  Leakage of fluid:  Yes, clear  Vaginal bleeding:  No  Fetal movement:   Present    OB History    Para Term  AB Living   1 0 0 0 0 0   SAB TAB Ectopic Multiple Live Births   0 0 0 0 0      # Outcome Date GA Lbr Juan/2nd Weight Sex Delivery Anes PTL Lv   1 Current                Prenatal Course  Prenatal course was complicated by    Patient Active Problem List    Diagnosis Date Noted     Encounter for triage in pregnant patient 10/25/2020     Priority: Medium     Term pregnancy 10/25/2020     Priority: Medium     Need for Tdap vaccination 2020     Priority: Medium     Given 20       Prenatal care, first pregnancy 2020     Priority: Medium     Raynauds syndrome      Priority: Medium     Acne      Priority: Medium         OB US:   6/3/2020: 19w1d, anterior placenta previa, normal growth and anatomy, 3vc, normal fluid.  2020: 24w1d, anterior placenta 3cm from internal os    Prenatal labs:  Blood type: O pos  Rh: positive  Rubella: Immune  Hep B sAg: non-reactive  HIV: non-reactive  RPR:  negative  GBS: negative  Aneuploidy screening: NIPT wnl, msAFP screen neg  1h gtt: 130  3h gtt: wnl x3  Hgb 10/1/2020: 13.4      Medical History   I have reviewed this patient's medical history and updated it with pertinent information if needed.   Past Medical History:   Diagnosis Date     Acne     minocycline     Chickenpox      Raynauds syndrome     has seen Dr. Zamora.        Past Surgical History  I have reviewed this patient's surgical history and updated it with pertinent information if needed.  Past Surgical History:   Procedure Laterality Date     MOUTH SURGERY      wisdom teeth       Prior to Admission Medications  No current facility-administered medications on file prior to encounter.        Prenatal Vit-Fe Fumarate-FA (PRENATAL MULTIVITAMIN W/IRON) 27-0.8 MG tablet, Take 1 tablet by mouth daily        Allergies     Allergies   Allergen Reactions     Nkda [No Known Drug Allergies]        Social History  Here with  Darron.  I have reviewed this patient's social history and updated it with pertinent information if needed. Jimena Irvin  reports that she has never smoked. She has never used smokeless tobacco. She reports previous alcohol use. She reports that she does not use drugs.    Family History  I have reviewed this patient's family history and updated it with pertinent information if needed.   Family History   Problem Relation Age of Onset     Family History Negative Other      Hypertension Father      Dementia Maternal Grandmother      Cancer Maternal Grandfather      Breast Cancer No family hx of        Immunization History  Immunization History   Administered Date(s) Administered     Influenza Vaccine IM > 6 months Valent IIV4 11/11/2016, 10/01/2020     TDAP Vaccine (Adacel) 03/12/2019, 09/04/2020     Tdap (Adacel,Boostrix) 12/16/2009       Physical Exam  Vitals:    10/25/20 0232   BP: 115/78   BP Location: Right arm   Pulse: 66   Resp: 16   Temp: 98.1  F (36.7  C)   TempSrc: Oral   Weight: 67.1  "kg (148 lb)   Height: 1.778 m (5' 10\")     Estimated body mass index is 21.24 kg/m  as calculated from the following:    Height as of this encounter: 1.778 m (5' 10\").    Weight as of this encounter: 67.1 kg (148 lb).    Fetal Heart Tones: 145 baseline, moderate variablility, no accels, no decels and Category I  TOCO:   frequency q 3-4 minutes    Constitutional: healthy, alert and active   Respiratory: Normal respiratory effort  Cardiovascular: normal pulses, no edema  Abdomen: gravid, single vertex fetus, non-tender, EFW 7 lbs  :  Cervix:   Membranes: SROM   Dilation: 2   Effacement: 70%   Station:-2   Consistency: average   Position: Posterior  Skin/Extremites: warm, well perfused, nontender  Neurologic: grossly intact      Bedside ultrasound: cephalic with fetal spine along maternal left    Labs  Recent Results (from the past 24 hour(s))   Rupture of Fetal Membranes by ROM Plus    Collection Time: 10/25/20  2:40 AM   Result Value Ref Range    Rupture of Fetal Membranes by ROM Plus Positive (A) NEG^Negative           "

## 2020-10-25 NOTE — PROGRESS NOTES
Patient is comfortable   Bladder straight cathed for large amount of urine.  Cervix check - tiny anterior lip which I reduced gently.  toco - doublet pattern   EFM 1 moderate variability with variable decels that are mostly early  ? Nuchal cord   Will begin low dose pitocin to have a better ctx pattern and will be able to start pushing once I confirm that the anterior lip did not slip back.   Magaly Nunn MD   13:20

## 2020-10-25 NOTE — PLAN OF CARE
Labor Shift Note  Data: Contraction frequency q 2-3 minutes, palpate strong. Fetal assessment category two.   Vitals:    10/25/20 1230 10/25/20 1235 10/25/20 1248 10/25/20 1449   BP:   108/71 120/58   BP Location:       Pulse:       Resp:       Temp:  98.8  F (37.1  C)  99.6  F (37.6  C)   TempSrc:  Oral  Oral   SpO2: 99% 99%     Weight:       Height:       . No intake/output data recorded..  Leaking small amounts of clear  fluid.  Signs and symptoms of infection absent hourly temperature last one 99.6  Blood pressures stable. Signs and symptoms of pre-eclampsia: absent, Support person Darron at bedside present.  Interventions: Continue uterine/fetal assessment continuously, Vital Signs per order set. Comfort measures met.  Medicated for Epidural.pt pushing with epidural Dr Nunn at bedside fetal heart having deep variable with pushed using oxygen as needed per Dr Nunn   Plan: Anticipate . Provide labor/coping assistance as needed by patient and support person.  Observe for and notify care provider of indications of progressing labor, need for pain medications,  or signs of fetal/maternal compromise.

## 2020-10-25 NOTE — PLAN OF CARE
Report received from Eduin RN and BOBBY Loco at 1510.  of viable baby girl at 1539. NICU present at delivery. Baby born with vigorous cry, skin-to-skin with mother immediately after delivery.

## 2020-10-25 NOTE — PROGRESS NOTES
[facility] PROGRESS NOTE:   2020 7:25 AM         SUBJECTIVE:   Patient comfortable with epidural.    Contractions:  q 2-4 minutes  Leakage of fluid:  Yes, clear fluid  Vaginal bleeding:  No  Pain controlled:  Yes           OBJECTIVE:     Vitals:    10/25/20 0644 10/25/20 0645 10/25/20 0650 10/25/20 0655   BP: 106/73 107/74 108/73 109/75   BP Location:       Pulse:       Resp: 16 16 16 16   Temp:       TempSrc:       SpO2:  100% 99% 99%   Weight:       Height:             NST:  Fetal Heart Rate Tracin bpm baseline, mod variability, + accels, occasional early and late decels, improved with IV fluid bolus  Category 2    Tocometer: q 2-4 minutes    Gen: alert, oriented, no distress  Abdomen:  Gravid, nontender  Cervix:   Dilation: 4   Effacement: 80%   Station:-1   Consistency: soft   Position: Mid         LABS:     Recent Results (from the past 12 hour(s))   Rupture of Fetal Membranes by ROM Plus    Collection Time: 10/25/20  2:40 AM   Result Value Ref Range    Rupture of Fetal Membranes by ROM Plus Positive (A) NEG^Negative   Asymptomatic COVID-19 Virus (Coronavirus) by PCR    Collection Time: 10/25/20  3:05 AM    Specimen: Nasopharyngeal   Result Value Ref Range    COVID-19 Virus PCR to [facility] - Source Nasopharyngeal     COVID-19 Virus PCR to [facility] - Result       Test received-See reflex to IDDL test SARS CoV2 (COVID-19) Virus RT-PCR   SARS-CoV-2 COVID-19 Virus (Coronavirus) RT-PCR Nasopharyngeal    Collection Time: 10/25/20  3:05 AM    Specimen: Nasopharyngeal   Result Value Ref Range    SARS-CoV-2 Virus Specimen Source Nasopharyngeal     SARS-CoV-2 PCR Result NEGATIVE     SARS-CoV-2 PCR Comment       Testing was performed using the Xpert Xpress SARS-CoV-2 Assay on the Cepheid Gene-Xpert   Instrument Systems. Additional information about this Emergency Use Authorization (EUA)   assay can be found via the Lab Guide.     ABO/Rh type and screen    Collection Time: 10/25/20  3:51 AM    Result Value Ref Range    ABO O     RH(D) Pos     Antibody Screen Neg     Test Valid Only At          [facility]    Specimen Expires 10/28/2020    CBC with platelets differential    Collection Time: 10/25/20  3:51 AM   Result Value Ref Range    WBC 8.7 4.0 - 11.0 10e9/L    RBC Count 4.00 3.8 - 5.2 10e12/L    Hemoglobin 12.7 11.7 - 15.7 g/dL    Hematocrit 38.6 35.0 - 47.0 %    MCV 97 78 - 100 fl    MCH 31.8 26.5 - 33.0 pg    MCHC 32.9 31.5 - 36.5 g/dL    RDW 12.6 10.0 - 15.0 %    Platelet Count 100 (L) 150 - 450 10e9/L    Diff Method Automated Method     % Neutrophils 73.1 %    % Lymphocytes 18.5 %    % Monocytes 6.7 %    % Eosinophils 0.6 %    % Basophils 0.6 %    % Immature Granulocytes 0.5 %    Nucleated RBCs 0 0 /100    Absolute Neutrophil 6.4 1.6 - 8.3 10e9/L    Absolute Lymphocytes 1.6 0.8 - 5.3 10e9/L    Absolute Monocytes 0.6 0.0 - 1.3 10e9/L    Absolute Eosinophils 0.1 0.0 - 0.7 10e9/L    Absolute Basophils 0.1 0.0 - 0.2 10e9/L    Abs Immature Granulocytes 0.0 0 - 0.4 10e9/L    Absolute Nucleated RBC 0.0               ASSESSMENT / PLAN:   34 year old  at 39w4d admitted in spontaneous labor with SROM.    Labor: progressing spontaneously, continue expectant management  Fetal well being: Category 2 tracing since epidural, likely due to hypotension, improved to category 1 with IV fluid bolus  GBS: neg  Pain: epidural  Anticipate .    Lucinda Peres MD

## 2020-10-26 ENCOUNTER — LACTATION ENCOUNTER (OUTPATIENT)
Age: 35
End: 2020-10-26

## 2020-10-26 VITALS
HEIGHT: 70 IN | BODY MASS INDEX: 21.19 KG/M2 | WEIGHT: 148 LBS | HEART RATE: 55 BPM | SYSTOLIC BLOOD PRESSURE: 100 MMHG | OXYGEN SATURATION: 100 % | RESPIRATION RATE: 12 BRPM | TEMPERATURE: 98.8 F | DIASTOLIC BLOOD PRESSURE: 72 MMHG

## 2020-10-26 LAB — HGB BLD-MCNC: 12.5 G/DL (ref 11.7–15.7)

## 2020-10-26 PROCEDURE — 250N000013 HC RX MED GY IP 250 OP 250 PS 637: Performed by: OBSTETRICS & GYNECOLOGY

## 2020-10-26 PROCEDURE — 36415 COLL VENOUS BLD VENIPUNCTURE: CPT | Performed by: OBSTETRICS & GYNECOLOGY

## 2020-10-26 PROCEDURE — 85018 HEMOGLOBIN: CPT | Performed by: OBSTETRICS & GYNECOLOGY

## 2020-10-26 RX ADMIN — IBUPROFEN 800 MG: 800 TABLET ORAL at 11:35

## 2020-10-26 RX ADMIN — IBUPROFEN 800 MG: 800 TABLET ORAL at 04:27

## 2020-10-26 RX ADMIN — ACETAMINOPHEN 650 MG: 325 TABLET, FILM COATED ORAL at 15:03

## 2020-10-26 RX ADMIN — ACETAMINOPHEN 650 MG: 325 TABLET, FILM COATED ORAL at 11:35

## 2020-10-26 NOTE — PLAN OF CARE
Data: Vital signs within normal limits. Postpartum checks within normal limits - fundus firm at U/2. Patient eating and drinking normally. Patient able to empty bladder independently and is up ambulating. No apparent signs of infection. Patient performing self cares and is able to care for infant.  Action: Patient medicated during the shift for pain and cramping. See MAR. Patient stated she was comfortable.   Response: Positive attachment behaviors observed with infant. Support person, , present.   Plan: Continue with plan of care.

## 2020-10-26 NOTE — PROGRESS NOTES
"S; Feeling good, a little sore.  Lochia slowing.  Urinating Ok.  Breastfeeding.    O: /72   Pulse 55   Temp 98.8  F (37.1  C) (Oral)   Resp 12   Ht 1.778 m (5' 10\")   Wt 67.1 kg (148 lb)   LMP 2020   SpO2 100%   Breastfeeding Unknown   BMI 21.24 kg/m       Abd: SNT, fundus firm  Ex: no calf tenderness    Hgb: 12.5    A/P:  1) PPD#1 S/P    Doing well, meeting all goals for discharge.  Instructions given.  Has all meds at home.  RTC 6 weeks    MARLINE RIZVI MD    "

## 2020-10-26 NOTE — DISCHARGE SUMMARY
Admit date: 10/25/20  Discharge date: 10/26/20    Admit dx: IUP at 39w4d, SROM, early labor  Discharge dx: same, s/p     Admitting physician: Lucinda Peres MD  Discharging physician: Marline Schaefer MD    Hospital course;    33yo  at 39w4d who presented with SROM in early labor, cervical exam /2.  CAT 1 FHR tracing. She was expectantly managed until she was 9+ cm, but had an irregular ctx pattern, so pitocin AOL was started.  She progressed to complete and pushed to deliver a viable female infant.  No concerns with placental delivery and bleeding was minimal.    After an uncomplicated postpartum recovery, she was transferred to the postpartum floor.  She has continued to do well, meeting all postpartum goals.  On PPD1, she is feeling well and desires discharge home after 24hrs.  Instructions were given for pp restrictions.  Plan for postpartum visit in 6 weeks.    MARLINE SCHAEFER MD

## 2020-10-26 NOTE — PROGRESS NOTES
Patient arrived to [facility] unit via wheelchair at 1830,with belongings, accompanied by spouse/ significant other, with infant in arms. Received report from Cindi Amador RN and checked bands. Unit and room orientation completd. Call light given and within arms reach; no concerns present at this time. Continue with plan of care.

## 2020-10-26 NOTE — LACTATION NOTE
This note was copied from a baby's chart.  Consult for: First time breastfeeding, sleepy baby.     History:  Vaginal delivery @ 39w4d, AGA infant @ 7# 11.8 oz. birthweight, less than 24 hours old at time of visit. Maternal history of Raynauds, elevated GCT but GTT WNL.     Breasts are soft, rounded, symmetric with everted nipples bilaterally, left one has scab across tip and right one two tiny blisters.  Infant has moderately high arch to palate, palapable lingual frenulum but good tongue extension well beyond lower gum ridge when suck on finger and beyond lips spontaneously, visualized good lift.     Infant had not fed in 5 hours or so per mom, sleeping without cues. Brought her to breast with some rooting but no latch. Mom need coaching to help bring baby to breast and shape breast for deeper latch. Demo with full assist to get her on with deep latch she began sucking readily, demo again for mom with hands on assist mom mirror LC hands, then mom do with minimal assist on second side. Encouraged to have baby nose across from nipple for positioning and aim nose to nipple for latching. Baby fed well on both sides with encouragement, when she pulled off second side & appear contented, milk drip down from breast as she pulled away. Worked with Jimena on hand expressing techniques, she was able to express several drops without pain then demo for both parents how to spoon feed results.     Education provided: Discussed positioning with good support, anatomy of breast and infant mouth, tips to get and maintain deeper latch, breast compressions to enhance milk transfer prn, point out nutritive vs. non-nutritive suck and how to hear swallows, benefits of skin to skin and feeding on cue, supply and demand, benefits of and how to do breast massage & hand expression. Reviewed baby's second night, how to tell when satiated and if getting enough, what to expect in the coming days and preventing engorgement, breastfeeding log with  when and who to call if concerns, [facility] pump cleaning handout and breastfeeding resource list adding in additional online resource. Gave support and encouragement, answered questions.     Plan: Please encourage frequent skin to skin, breastfeed on cue with goal of 8 to 12 feedings in 24 hours & hand express after feedings until milk is in, feed back results. Follow up with outpatient lactation consultant as needed for support with first time breastfeeding.

## 2020-10-26 NOTE — PLAN OF CARE
VS stable, no light headedness with ambulation.  Pain well controlled with ibuprofen and acetaminophen. Using ammon ice packs for tenderness and swelling.  Able to eat and drink.  Able to care for infant without difficulty. Voiding and passing flatus.  Does not need breast pump, has one at home.  Is having nipple pain with feeding, encouraged her to try several different holds. No cracks or bleeding. Gave hydrogel dressing and lanolin. Informed not to use at same time.  Bleeding and Fundus WNL.  Plans to discharge home today.

## 2020-10-26 NOTE — L&D DELIVERY NOTE
OB Vaginal Delivery Note    Jimena Irvin MRN# [id number]   Age: 34 year old YOB: 1985       GA: 39w4d  GP:   Labor Complications: None   EBL:   mL  Delivery QBL: 31 mL  Delivery Type: Vaginal, Spontaneous   ROM to Delivery Time: (Delivered) Hours: 16 Minutes: 9  [place] Weight: 3.51 kg (7 lb 11.8 oz)    1 Minute 5 Minute 10 Minute   Apgar Totals: 9   9        KENYON DIETRICH;TOLU TRACEY;LILLIANA TURNER     Delivery Details:  Jimena Irvin, a 34 year old  female delivered a viable infant with apgars of 9  and 9 . Patient was fully dilated and pushing after 26  hours 3  minutes in active labor. Delivery was via vaginal, spontaneous  to a sterile field under epidural  anesthesia. Infant delivered in vertex  left  occiput  anterior  position. Anterior and posterior shoulders delivered without difficulty. The cord was clamped, cut twice and 3 vessels  were noted. Cord blood was obtained in routine fashion with the following disposition: lab .      Cord complications: nuchal  x 2 which was reduced on the perineum  Placenta delivered at 10/25/2020  3:45 PM . Placental disposition was Hospital disposal . Fundal massage performed and fundus found to be firm.     Episiotomy: none    Perineum, vagina, cervix were inspected, and the following lacerations were noted:   Perineal lacerations: bilateral inner labial with L>R 2 interrupted stitches placed in the left labial laceration and one placed above the urethra. The perineum required a couple stitches to reinforce the rectal sphincter and remainder of the repair was small and done in the usual fashion.   periurethral laceration: right  labial laceration: bilateral           Lacerations were repaired in the usual fashion using 3.0 for the perineum and 4.0 for the periurethral and inner left labia minora.    Excellent hemostasis was noted. Needle count correct. Infant and patient in delivery room in good and stable condition.        Brandee  Jag [[id number]]    Labor Event Times    Labor onset date: 10/24/20 Onset time: 11:45 AM   Dilation complete date: 10/25/20 Complete time:  1:48 PM   Start pushing date/time: 10/25/2020 1351      Labor Length    1st Stage (hrs): 26 (min): 3   2nd Stage (hrs): 1 (min): 50   3rd Stage (hrs): 0 (min): 6      Labor Events     labor?: No  Labor Type: Spontaneous     Antibiotics received during labor?: No     Rupture identifier: Sac 1  Rupture date/time: 10/24/20 233   Rupture type: Spontaneous rupture of membranes prior to induction  Fluid color: Clear, Pink  Fluid odor: Normal     Delivery/Placenta Date and Time    Delivery Date: 10/25/20 Delivery Time:  3:39 PM   Placenta Date/Time: 10/25/2020  3:45 PM  Oxytocin given at the time of delivery: after delivery of baby     Vaginal Counts     Initial count performed by 2 team members:  Two Team Members   MD MOO Tovar RN       Needles Suture Norris Sponges Instruments   Initial counts 1 0 5    Added to count 0 3 0    Final counts 1 3 5    Placed during labor Accounted for at the end of labor   No NA   No NA   No NA    Final count performed by 2 team members:  Two Team Members   BOBBY Whelan MD      Final count correct?: Yes     Apgars    Living status: Living   1 Minute 5 Minute 10 Minute 15 Minute 20 Minute   Skin color: 1  1       Heart rate: 2  2       Reflex irritability: 2  2       Muscle tone: 2  2       Respiratory effort: 2  2       Total: 9  9       Apgars assigned by: MDAHLRN     Cord    Vessels: 3 Vessels Complications: Nuchal   Cord Blood Disposition: Lab Gases Sent?: Yes       Resuscitation    Methods: None  [place] Care at Delivery: Asked by Dr. Nunn to attend the delivery of this term, female infant with a gestational age of 39 4/7 weeks secondary to variable decels and nuchal cord.      The infant was stimulated, cried and had spontaneous respirations on mom's chest.  NICU team was dismissed  "without touching baby.    DEREJE Zaidi, NNP-BC 10/25/2020 3:57 PM  [facility]  Output in Delivery Room: Stool     [place] Measurements    Weight: 7 lb 11.8 oz Length: 1' 8.5\"   Head circumference: 34.3 cm       Skin to Skin and Feeding Plan    Skin to skin initiation date/time: 1/10/1841    Skin to skin with: Mother  Skin to skin end date/time: 1/10/1841    Breastfeeding initiated date/time: 10/25/2020 1640  How do you plan to feed your baby: Breastfeeding     Labor Events and Shoulder Dystocia    Fetal Tracing Prior to Delivery: Category 2  Fetal Tracing Comments: intermittent decels with contractions, improved with position change  nuchal cord x 2 noted at delivery  Shoulder dystocia present?: Neg     Delivery (Maternal) (Provider to Complete) ([id number])    Episiotomy: None  Perineal lacerations: 2nd Repaired?: Yes   Periurethral laceration: right Repaired?: Yes   Labial laceration: bilateral Repaired?: Yes   Number of repair packets: 1     Blood Loss  Mother: Jimena Irvin #[id number]   Start of Mother's Information    IO Blood Loss  10/24/20 1145 - 10/25/20 1919    Delivery QBL (mL) Hospital Encounter 31 mL    Total  31 mL         End of Mother's Information  Mother: Jimena Irvin #[id number]          Delivery - Provider to Complete ([id number])    Delivering clinician: Magaly Nunn MD  Attempted Delivery Types (Choose all that apply): Spontaneous Vaginal Delivery  Delivery Type (Choose the 1 that will go to the Birth History): Vaginal, Spontaneous                   Other personnel:  Provider Role   Magaly Nunn MD Obstetrician   Cindi Amador, BOBBY Delivery Nurse   Melyssa Hooks RN Delivery Nurse                Placenta    Delayed Cord Clamping: Done  Date/Time: 10/25/2020  3:45 PM  Removal: Spontaneous  Disposition: Hospital disposal           Anesthesia    Method: Epidural  Cervical dilation at placement: 0-3                Presentation and Position  "   Presentation: Vertex    Position: Left Occiput Anterior                 Magaly Nunn MD

## 2020-12-22 ENCOUNTER — OFFICE VISIT (OUTPATIENT)
Dept: OBGYN | Facility: CLINIC | Age: 35
End: 2020-12-22
Payer: COMMERCIAL

## 2020-12-22 VITALS
HEART RATE: 59 BPM | WEIGHT: 127 LBS | DIASTOLIC BLOOD PRESSURE: 63 MMHG | BODY MASS INDEX: 18.22 KG/M2 | SYSTOLIC BLOOD PRESSURE: 97 MMHG

## 2020-12-22 DIAGNOSIS — Z30.430 ENCOUNTER FOR IUD INSERTION: Primary | ICD-10-CM

## 2020-12-22 DIAGNOSIS — Z20.822 EXPOSURE TO COVID-19 VIRUS: ICD-10-CM

## 2020-12-22 DIAGNOSIS — Z30.011 ENCOUNTER FOR INITIAL PRESCRIPTION OF CONTRACEPTIVE PILLS: ICD-10-CM

## 2020-12-22 PROBLEM — Z34.00 PRENATAL CARE, FIRST PREGNANCY: Status: RESOLVED | Noted: 2020-03-21 | Resolved: 2020-12-22

## 2020-12-22 PROBLEM — Z23 NEED FOR TDAP VACCINATION: Status: RESOLVED | Noted: 2020-09-04 | Resolved: 2020-12-22

## 2020-12-22 PROBLEM — Z36.89 ENCOUNTER FOR TRIAGE IN PREGNANT PATIENT: Status: RESOLVED | Noted: 2020-10-25 | Resolved: 2020-12-22

## 2020-12-22 PROBLEM — Z34.90 TERM PREGNANCY: Status: RESOLVED | Noted: 2020-10-25 | Resolved: 2020-12-22

## 2020-12-22 LAB — HCG UR QL: NEGATIVE

## 2020-12-22 PROCEDURE — 86769 SARS-COV-2 COVID-19 ANTIBODY: CPT | Performed by: OBSTETRICS & GYNECOLOGY

## 2020-12-22 PROCEDURE — 99207 PR POST PARTUM EXAM: CPT | Performed by: OBSTETRICS & GYNECOLOGY

## 2020-12-22 PROCEDURE — 36415 COLL VENOUS BLD VENIPUNCTURE: CPT | Performed by: OBSTETRICS & GYNECOLOGY

## 2020-12-22 PROCEDURE — 81025 URINE PREGNANCY TEST: CPT | Performed by: OBSTETRICS & GYNECOLOGY

## 2020-12-22 RX ORDER — ACETAMINOPHEN AND CODEINE PHOSPHATE 120; 12 MG/5ML; MG/5ML
0.35 SOLUTION ORAL DAILY
Qty: 90 TABLET | Refills: 3 | Status: SHIPPED | OUTPATIENT
Start: 2020-12-22 | End: 2022-03-03

## 2020-12-22 ASSESSMENT — PATIENT HEALTH QUESTIONNAIRE - PHQ9: SUM OF ALL RESPONSES TO PHQ QUESTIONS 1-9: 0

## 2020-12-22 NOTE — PROGRESS NOTES
Jimena is here for a 6-week postpartum checkup.    She had a  of a viable girl, weight 7 pounds 12 oz., with no complications.  Since delivery, she has been breast feeding.  She has no signs of infection, bleeding or other complications.  She is not pregnant.  We discussed contraceptions and she has chosen mini pill / progesterone only pill.  She denies feeling sad, depressed or too overwhelmed.    EXAM:    HEENT: grossly normal.  NECK: no lymphadenopathy or thyroidomegaly.  LUNGS: CTA X 2, no rales or crackles.  BACK: No spinal or CVA tenderness.  HEART: RRR without murmurs clicks or gallops.  ABDOMEN: soft, non tender, good bowel sounds, without masses rebound, guarding or tenderness.    PELVIC:    External genitalia: normal without lesion, repair well healed.                            Vagina: normal mucosa and rugae, no discharge.  Cervix: multiparous, well healed, without lesion.  Uterus: non pregnant in size, firm , mobile, no lesions.  Adnexa: non tender, without masses    EXTREMITIES: Warm to touch, good pulses, no ankle edema or calf tenderness.  NEUROLOGIC: grossly normal.    ASSESSMENT:   Normal 6-week postpartum exam after .    PLAN:  Pap smear utd and mini pill / progesterone only pill for contraception.  She may consider IUD in future, but not totally comfortable with it.  Discussed efficacy and insertion procedure.  Questions answered.  RTC yearly or prn.    MARLINE RIZVI MD

## 2020-12-22 NOTE — NURSING NOTE
"Chief Complaint   Patient presents with     Post Partum Exam     IUD     iud insertion       Initial BP 97/63   Pulse 59   Wt 57.6 kg (127 lb)   LMP 2020   Breastfeeding Yes   BMI 18.22 kg/m   Estimated body mass index is 18.22 kg/m  as calculated from the following:    Height as of 10/25/20: 1.778 m (5' 10\").    Weight as of this encounter: 57.6 kg (127 lb).  BP completed using cuff size: regular    Questioned patient about current smoking habits.  Pt. has never smoked.          The following HM Due: NONE      The following patient reported/Care Every where data was sent to:  P ABSTRACT QUALITY INITIATIVES [[id number]]        patient has appointment for today  Janny Rodriguez                "

## 2020-12-22 NOTE — PATIENT INSTRUCTIONS

## 2020-12-26 LAB
COVID-19 SPIKE RBD ABY TITER: NORMAL
COVID-19 SPIKE RBD ABY: NEGATIVE

## 2021-09-05 ENCOUNTER — HEALTH MAINTENANCE LETTER (OUTPATIENT)
Age: 36
End: 2021-09-05

## 2021-11-19 ENCOUNTER — MYC MEDICAL ADVICE (OUTPATIENT)
Dept: OBGYN | Facility: CLINIC | Age: 36
End: 2021-11-19
Payer: COMMERCIAL

## 2021-11-19 DIAGNOSIS — Z01.419 ENCOUNTER FOR GYNECOLOGICAL EXAMINATION WITHOUT ABNORMAL FINDING: ICD-10-CM

## 2021-11-22 RX ORDER — NORGESTIMATE AND ETHINYL ESTRADIOL 0.25-0.035
1 KIT ORAL DAILY
Qty: 84 TABLET | Refills: 0 | Status: SHIPPED | OUTPATIENT
Start: 2021-11-22 | End: 2022-02-11

## 2021-11-22 NOTE — TELEPHONE ENCOUNTER
Patient requesting refill of combo OCP. Currently on norethindrone however has weaned from breastfeeding. Took Ortho-Cyclen prior to her pregnancy, order queued up for signature. Patient is due for an annual but already has it scheduled for early January.  Mandi Sorto RN

## 2022-02-11 ENCOUNTER — MYC REFILL (OUTPATIENT)
Dept: OBGYN | Facility: CLINIC | Age: 37
End: 2022-02-11
Payer: COMMERCIAL

## 2022-02-11 DIAGNOSIS — Z01.419 ENCOUNTER FOR GYNECOLOGICAL EXAMINATION WITHOUT ABNORMAL FINDING: ICD-10-CM

## 2022-02-11 RX ORDER — NORGESTIMATE AND ETHINYL ESTRADIOL 0.25-0.035
1 KIT ORAL DAILY
Qty: 84 TABLET | Refills: 0 | Status: SHIPPED | OUTPATIENT
Start: 2022-02-11 | End: 2022-03-03

## 2022-02-20 ENCOUNTER — HEALTH MAINTENANCE LETTER (OUTPATIENT)
Age: 37
End: 2022-02-20

## 2022-03-03 ENCOUNTER — OFFICE VISIT (OUTPATIENT)
Dept: OBGYN | Facility: CLINIC | Age: 37
End: 2022-03-03
Payer: COMMERCIAL

## 2022-03-03 VITALS
BODY MASS INDEX: 17.79 KG/M2 | WEIGHT: 124 LBS | TEMPERATURE: 97.5 F | DIASTOLIC BLOOD PRESSURE: 63 MMHG | HEART RATE: 64 BPM | SYSTOLIC BLOOD PRESSURE: 84 MMHG

## 2022-03-03 DIAGNOSIS — Z30.41 ORAL CONTRACEPTIVE PILL SURVEILLANCE: ICD-10-CM

## 2022-03-03 DIAGNOSIS — Z01.419 ENCOUNTER FOR GYNECOLOGICAL EXAMINATION WITHOUT ABNORMAL FINDING: Primary | ICD-10-CM

## 2022-03-03 PROCEDURE — 99395 PREV VISIT EST AGE 18-39: CPT | Performed by: OBSTETRICS & GYNECOLOGY

## 2022-03-03 RX ORDER — NORGESTIMATE AND ETHINYL ESTRADIOL 7DAYSX3 LO
1 KIT ORAL DAILY
Qty: 84 TABLET | Refills: 3 | Status: SHIPPED | OUTPATIENT
Start: 2022-03-03 | End: 2023-02-01

## 2022-03-03 RX ORDER — MULTIPLE VITAMINS W/ MINERALS TAB 9MG-400MCG
1 TAB ORAL DAILY
COMMUNITY

## 2022-03-03 RX ORDER — NORGESTIMATE AND ETHINYL ESTRADIOL 0.25-0.035
1 KIT ORAL DAILY
Qty: 84 TABLET | Refills: 3 | Status: SHIPPED | OUTPATIENT
Start: 2022-03-03 | End: 2022-03-03

## 2022-03-03 NOTE — PROGRESS NOTES
Jimena is a 36 year old  female who presents for annual exam.     Menses are regular q 28-30 days and normal lasting 5 days.  Menses flow: normal.  Patient's last menstrual period was 02/15/2022.. Using oral contraceptives for contraception.  She is not currently considering pregnancy.  Besides routine health maintenance, she has no other health concerns today .  GYNECOLOGIC HISTORY:  Menarche: [age]  Age at first intercourse: 18 Number of lifetime partners: 6  Jimena is sexually active with 1male partner(s) and is currently in monogamous relationship.    History sexually transmitted infections:No STD history  STI testing offered?  Declined  TED exposure: No  History of abnormal Pap smear: NO - age 30- 65 PAP every 3 years recommended  Family history of breast CA: Yes (Please explain): cousin  Family history of uterine/ovarian CA: No    Family history of colon CA: Yes (Please explain): mgfa    HEALTH MAINTENANCE:  Cholesterol: (No results found for: CHOL History of abnormal lipids: No  Mammo: no . History of abnormal Mammo: Not applicable.  Regular Self Breast Exams: No  Calcium/Vitamin D intake: source:  dairy, dietary supplement(s) Adequate? Yes  TSH: (No results found for: TSH )  Pap; (  Lab Results   Component Value Date    PAP NIL 2020    PAP NIL 2016    )    HISTORY:  OB History    Para Term  AB Living   1 1 1 0 0 1   SAB IAB Ectopic Multiple Live Births   0 0 0 0 1      # Outcome Date GA Lbr Juan/2nd Weight Sex Delivery Anes PTL Lv   1 Term 10/25/20 39w4d 26:03 / 01:50 3.51 kg (7 lb 11.8 oz) F Vag-Spont EPI N GOMEZ      Name: ALMAFEMALE-JIMENA      Apgar1: 9  Apgar5: 9      Obstetric Comments   Huyen     Past Medical History:   Diagnosis Date     Acne     minocycline     Chickenpox      Raynauds syndrome     has seen Dr. Zamora.      Past Surgical History:   Procedure Laterality Date     MOUTH SURGERY      wisdom teeth     Family History   Problem Relation Age of Onset      Hypertension Father      Dementia Maternal Grandmother      Cancer Maternal Grandfather      Family History Negative Other      Breast Cancer Paternal Cousin         triple negative invasive ductal carcinoma age 40     Social History     Socioeconomic History     Marital status:      Spouse name: None     Number of children: 0     Years of education: None     Highest education level: None   Occupational History     Occupation: manager     Employer: [employer]   Tobacco Use     Smoking status: Never Smoker     Smokeless tobacco: Never Used   Substance and Sexual Activity     Alcohol use: Not Currently     Comment: occas-quit with pregnancy     Drug use: No     Sexual activity: Yes     Partners: Male   Other Topics Concern     Parent/sibling w/ CABG, MI or angioplasty before 65F 55M? Not Asked   Social History Narrative    Mom is Delmer Chahal (AJ pt.) but parents transferred to [place] for Dad's work in 2008.  Brother still lives in [place] and parents own condo here. Lived in [facility] for 6 years but moved back in 2006     Social Determinants of Health     Financial Resource Strain: Not on file   Food Insecurity: Not on file   Transportation Needs: Not on file   Physical Activity: Not on file   Stress: Not on file   Social Connections: Not on file   Intimate Partner Violence: Not on file   Housing Stability: Not on file       Current Outpatient Medications:      multivitamin w/minerals (MULTI-VITAMIN) tablet, Take 1 tablet by mouth daily, Disp: , Rfl:      norgestim-eth estrad triphasic (ORTHO TRI-CYCLEN LO) 0.18/0.215/0.25 MG-25 MCG tablet, Take 1 tablet by mouth daily, Disp: 84 tablet, Rfl: 3     Allergies   Allergen Reactions     Nkda [No Known Drug Allergies]        Past medical, surgical, social and family history were reviewed and updated in EPIC.    ROS:   C:     NEGATIVE for fever, chills, change in weight  I:       NEGATIVE for worrisome rashes, moles or lesions  E:     NEGATIVE for vision changes  or irritation  E/M: NEGATIVE for ear, mouth and throat problems  R:     NEGATIVE for significant cough or SOB  CV:   NEGATIVE for chest pain, palpitations or peripheral edema  GI:     NEGATIVE for nausea, abdominal pain, heartburn, or change in bowel habits  :   NEGATIVE for frequency, dysuria, hematuria, vaginal discharge, or irregular bleeding  M:     NEGATIVE for significant arthralgias or myalgia  N:      NEGATIVE for weakness, dizziness or paresthesias  E:      NEGATIVE for temperature intolerance, skin/hair changes  P:      NEGATIVE for changes in mood or affect.    EXAM:  BP (!) 84/63   Pulse 64   Temp 97.5  F (36.4  C)   Wt 56.2 kg (124 lb)   LMP 02/15/2022   Breastfeeding No   BMI 17.79 kg/m     BMI: Body mass index is 17.79 kg/m .  Constitutional: healthy, alert and no distress  Head: Normocephalic. No masses, lesions, tenderness or abnormalities  Neck: Neck supple. Trachea midline. No adenopathy. Thyroid symmetric, normal size.   Cardiovascular: RRR.   Respiratory: Negative.   Breast: Breasts reveal mild symmetric fibrocystic densities, but there are no dominant, discrete, fixed or suspicious masses found.  Gastrointestinal: Abdomen soft, non-tender, non-distended. No masses, organomegaly.  : Deferred  Rectal Exam: deferred  Musculoskeletal: extremities normal  Skin: no suspicious lesions or rashes  Psychiatric: Affect appropriate, cooperative,mentation appears normal.     COUNSELING:   Reviewed preventive health counseling, as reflected in patient instructions       Regular exercise       Healthy diet/nutrition       Contraception   reports that she has never smoked. She has never used smokeless tobacco.    Body mass index is 17.79 kg/m .    FRAX Risk Assessment    ASSESSMENT:  36 year old female with satisfactory annual exam  (Z01.419) Encounter for gynecological examination without abnormal finding  (primary encounter diagnosis)  Comment:   Plan: UTD on       (Z30.41) Oral contraceptive  pill surveillance  Comment:   Plan: norgestim-eth estrad triphasic (ORTHO         TRI-CYCLEN LO) 0.18/0.215/0.25 MG-25 MCG tablet

## 2022-10-23 ENCOUNTER — HEALTH MAINTENANCE LETTER (OUTPATIENT)
Age: 37
End: 2022-10-23

## 2023-02-01 ENCOUNTER — MYC MEDICAL ADVICE (OUTPATIENT)
Dept: OBGYN | Facility: CLINIC | Age: 38
End: 2023-02-01

## 2023-02-01 DIAGNOSIS — Z30.41 ORAL CONTRACEPTIVE PILL SURVEILLANCE: ICD-10-CM

## 2023-02-01 RX ORDER — NORGESTIMATE AND ETHINYL ESTRADIOL 7DAYSX3 LO
1 KIT ORAL DAILY
Qty: 84 TABLET | Refills: 0 | Status: SHIPPED | OUTPATIENT
Start: 2023-02-01 | End: 2023-04-27

## 2023-04-27 ENCOUNTER — OFFICE VISIT (OUTPATIENT)
Dept: OBGYN | Facility: CLINIC | Age: 38
End: 2023-04-27
Payer: COMMERCIAL

## 2023-04-27 VITALS
WEIGHT: 125 LBS | HEART RATE: 67 BPM | TEMPERATURE: 98.2 F | BODY MASS INDEX: 17.94 KG/M2 | DIASTOLIC BLOOD PRESSURE: 70 MMHG | SYSTOLIC BLOOD PRESSURE: 106 MMHG

## 2023-04-27 DIAGNOSIS — Z01.419 ENCOUNTER FOR GYNECOLOGICAL EXAMINATION WITHOUT ABNORMAL FINDING: Primary | ICD-10-CM

## 2023-04-27 DIAGNOSIS — Z13.220 LIPID SCREENING: ICD-10-CM

## 2023-04-27 DIAGNOSIS — Z11.59 ENCOUNTER FOR HEPATITIS C SCREENING TEST FOR LOW RISK PATIENT: ICD-10-CM

## 2023-04-27 DIAGNOSIS — Z30.41 ORAL CONTRACEPTIVE PILL SURVEILLANCE: ICD-10-CM

## 2023-04-27 PROCEDURE — 99395 PREV VISIT EST AGE 18-39: CPT | Performed by: OBSTETRICS & GYNECOLOGY

## 2023-04-27 RX ORDER — NORGESTIMATE AND ETHINYL ESTRADIOL 7DAYSX3 LO
1 KIT ORAL DAILY
Qty: 84 TABLET | Refills: 3 | Status: SHIPPED | OUTPATIENT
Start: 2023-04-27 | End: 2024-04-03

## 2023-04-27 ASSESSMENT — ANXIETY QUESTIONNAIRES
IF YOU CHECKED OFF ANY PROBLEMS ON THIS QUESTIONNAIRE, HOW DIFFICULT HAVE THESE PROBLEMS MADE IT FOR YOU TO DO YOUR WORK, TAKE CARE OF THINGS AT HOME, OR GET ALONG WITH OTHER PEOPLE: NOT DIFFICULT AT ALL
5. BEING SO RESTLESS THAT IT IS HARD TO SIT STILL: NOT AT ALL
1. FEELING NERVOUS, ANXIOUS, OR ON EDGE: NOT AT ALL
GAD7 TOTAL SCORE: 0
2. NOT BEING ABLE TO STOP OR CONTROL WORRYING: NOT AT ALL
GAD7 TOTAL SCORE: 0
7. FEELING AFRAID AS IF SOMETHING AWFUL MIGHT HAPPEN: NOT AT ALL
6. BECOMING EASILY ANNOYED OR IRRITABLE: NOT AT ALL
3. WORRYING TOO MUCH ABOUT DIFFERENT THINGS: NOT AT ALL

## 2023-04-27 ASSESSMENT — PATIENT HEALTH QUESTIONNAIRE - PHQ9
5. POOR APPETITE OR OVEREATING: NOT AT ALL
SUM OF ALL RESPONSES TO PHQ QUESTIONS 1-9: 0

## 2023-04-27 NOTE — PROGRESS NOTES
Jimena is a 37 year old  female who presents for annual exam.     Menses are regular q 28-30 days and normal lasting 5 days.  Menses flow: normal.  Patient's last menstrual period was 2023.. Using oral contraceptives for contraception.  She is not currently considering pregnancy.  Besides routine health maintenance, she has no other health concerns today .  Has had a lot of colds this year, probably 6. Does have a 1 yo. No severe infections. No known COVID to date.     GYNECOLOGIC HISTORY:  Menarche: [age]  Age at first intercourse: 18 Number of lifetime partners: 6  Jimena is sexually active with 1male partner(s) and is currently in monogamous relationship.    History sexually transmitted infections:No STD history  STI testing offered?  Declined  TED exposure: No  History of abnormal Pap smear: NO - age 30- 65 PAP every 3 years recommended  Family history of breast CA: Yes (Please explain): cousin  Family history of uterine/ovarian CA: No    Family history of colon CA: Yes (Please explain): mgfa    HEALTH MAINTENANCE:  Cholesterol: (No results found for: CHOL History of abnormal lipids: No  Mammo: no . History of abnormal Mammo: Not applicable.  Regular Self Breast Exams: No  Calcium/Vitamin D intake: source:  dairy, dietary supplement(s) Adequate? Yes  TSH: (No results found for: TSH )  Pap; (  Lab Results   Component Value Date    PAP NIL 2020    PAP NIL 2016    )    HISTORY:  OB History    Para Term  AB Living   1 1 1 0 0 1   SAB IAB Ectopic Multiple Live Births   0 0 0 0 1      # Outcome Date GA Lbr Juan/2nd Weight Sex Delivery Anes PTL Lv   1 Term 10/25/20 39w4d 26:03 / 01:50 3.51 kg (7 lb 11.8 oz) F Vag-Spont EPI N GOMEZ      Name: ALMAFEMALE-JIMENA      Apgar1: 9  Apgar5: 9      Obstetric Comments   Huyen     Past Medical History:   Diagnosis Date     Acne     minocycline     Chickenpox      Raynauds syndrome     has seen Dr. Zamora.      Past Surgical History:   Procedure  Laterality Date     MOUTH SURGERY      wisdom teeth     Family History   Problem Relation Age of Onset     Hypertension Father      Dementia Maternal Grandmother      Cancer Maternal Grandfather      Family History Negative Other      Breast Cancer Paternal Cousin         triple negative invasive ductal carcinoma age 40     Social History     Socioeconomic History     Marital status:      Number of children: 0   Occupational History     Occupation: manager     Employer: [employer]   Tobacco Use     Smoking status: Never     Smokeless tobacco: Never   Vaping Use     Vaping status: Never Used   Substance and Sexual Activity     Alcohol use: Not Currently     Comment: occas-quit with pregnancy     Drug use: No     Sexual activity: Yes     Partners: Male   Social History Narrative    Mom is Delmer Chahal (AJ pt.) but parents transferred to [place] for Dad's work in 2008.  Brother still lives in [place] and parents own condo here. Lived in [place] for 6 years but moved back in 2006       Current Outpatient Medications:      multivitamin w/minerals (MULTI-VITAMIN) tablet, Take 1 tablet by mouth daily, Disp: , Rfl:      norgestim-eth estrad triphasic (ORTHO TRI-CYCLEN LO) 0.18/0.215/0.25 MG-25 MCG tablet, Take 1 tablet by mouth daily, Disp: 84 tablet, Rfl: 0     Allergies   Allergen Reactions     Nkda [No Known Drug Allergy]        Past medical, surgical, social and family history were reviewed and updated in EPIC.    ROS:   C:     NEGATIVE for fever, chills, change in weight  I:       NEGATIVE for worrisome rashes, moles or lesions  E:     NEGATIVE for vision changes or irritation  E/M: NEGATIVE for ear, mouth and throat problems  R:     NEGATIVE for significant cough or SOB  CV:   NEGATIVE for chest pain, palpitations or peripheral edema  GI:     NEGATIVE for nausea, abdominal pain, heartburn, or change in bowel habits  :   NEGATIVE for frequency, dysuria, hematuria, vaginal discharge, or irregular  bleeding  M:     NEGATIVE for significant arthralgias or myalgia  N:      NEGATIVE for weakness, dizziness or paresthesias  E:      NEGATIVE for temperature intolerance, skin/hair changes  P:      NEGATIVE for changes in mood or affect.    EXAM:  /70   Pulse 67   Temp 98.2  F (36.8  C)   Wt 56.7 kg (125 lb)   LMP 04/11/2023   BMI 17.94 kg/m     BMI: Body mass index is 17.94 kg/m .  Constitutional: healthy, alert and no distress  Head: Normocephalic. No masses, lesions, tenderness or abnormalities  Neck: Neck supple. Trachea midline. No adenopathy. Thyroid symmetric, normal size.   Cardiovascular: RRR.   Respiratory: Negative.   Breast: Breasts reveal mild symmetric fibrocystic densities, but there are no dominant, discrete, fixed or suspicious masses found.  Gastrointestinal: Abdomen soft, non-tender, non-distended. No masses, organomegaly.  Rectal Exam: deferred  Musculoskeletal: extremities normal  Skin: no suspicious lesions or rashes  Psychiatric: Affect appropriate, cooperative,mentation appears normal.     COUNSELING:   Reviewed preventive health counseling, as reflected in patient instructions       Regular exercise       Healthy diet/nutrition       Contraception       Family planning       Consider Hep C screening for all patients one time for ages 18-79 years   reports that she has never smoked. She has never used smokeless tobacco.    Body mass index is 17.94 kg/m .    FRAX Risk Assessment    ASSESSMENT:  37 year old female with satisfactory annual exam  (Z01.419) Encounter for gynecological examination without abnormal finding  (primary encounter diagnosis)  Comment:   Plan: UTD on HM      (Z30.41) Oral contraceptive pill surveillance  Comment:   Plan: norgestim-eth estrad triphasic (ORTHO         TRI-CYCLEN LO) 0.18/0.215/0.25 MG-25 MCG tablet        Stable refills    (Z13.220) Lipid screening  Comment:   Plan: Lipid Profile (Chol, Trig, HDL, LDL calc)        No h/o testing, make fasting  appointment or do with annual next year.     (Z11.59) Encounter for hepatitis C screening test for low risk patient  Comment:   Plan: Hepatitis C Screen Reflex to HCV RNA Quant and         Genotype

## 2024-03-28 ENCOUNTER — PATIENT OUTREACH (OUTPATIENT)
Dept: CARE COORDINATION | Facility: CLINIC | Age: 39
End: 2024-03-28
Payer: COMMERCIAL

## 2024-04-11 ENCOUNTER — PATIENT OUTREACH (OUTPATIENT)
Dept: CARE COORDINATION | Facility: CLINIC | Age: 39
End: 2024-04-11
Payer: COMMERCIAL

## 2024-06-02 ENCOUNTER — HEALTH MAINTENANCE LETTER (OUTPATIENT)
Age: 39
End: 2024-06-02

## 2024-06-13 ENCOUNTER — OFFICE VISIT (OUTPATIENT)
Dept: OBGYN | Facility: CLINIC | Age: 39
End: 2024-06-13
Payer: COMMERCIAL

## 2024-06-13 VITALS
HEART RATE: 69 BPM | SYSTOLIC BLOOD PRESSURE: 110 MMHG | DIASTOLIC BLOOD PRESSURE: 72 MMHG | TEMPERATURE: 97.6 F | WEIGHT: 129 LBS | BODY MASS INDEX: 18.51 KG/M2

## 2024-06-13 DIAGNOSIS — Z01.419 ENCOUNTER FOR GYNECOLOGICAL EXAMINATION WITHOUT ABNORMAL FINDING: Primary | ICD-10-CM

## 2024-06-13 DIAGNOSIS — Z30.41 ORAL CONTRACEPTIVE PILL SURVEILLANCE: ICD-10-CM

## 2024-06-13 DIAGNOSIS — Z13.0 SCREENING, ANEMIA, DEFICIENCY, IRON: ICD-10-CM

## 2024-06-13 DIAGNOSIS — Z13.29 SCREENING FOR THYROID DISORDER: ICD-10-CM

## 2024-06-13 LAB
ERYTHROCYTE [DISTWIDTH] IN BLOOD BY AUTOMATED COUNT: 12 % (ref 10–15)
HCT VFR BLD AUTO: 42.4 % (ref 35–47)
HGB BLD-MCNC: 14 G/DL (ref 11.7–15.7)
MCH RBC QN AUTO: 30.7 PG (ref 26.5–33)
MCHC RBC AUTO-ENTMCNC: 33 G/DL (ref 31.5–36.5)
MCV RBC AUTO: 93 FL (ref 78–100)
PLATELET # BLD AUTO: 188 10E3/UL (ref 150–450)
RBC # BLD AUTO: 4.56 10E6/UL (ref 3.8–5.2)
WBC # BLD AUTO: 7.6 10E3/UL (ref 4–11)

## 2024-06-13 PROCEDURE — 36415 COLL VENOUS BLD VENIPUNCTURE: CPT | Performed by: OBSTETRICS & GYNECOLOGY

## 2024-06-13 PROCEDURE — 84443 ASSAY THYROID STIM HORMONE: CPT | Performed by: OBSTETRICS & GYNECOLOGY

## 2024-06-13 PROCEDURE — 85027 COMPLETE CBC AUTOMATED: CPT | Performed by: OBSTETRICS & GYNECOLOGY

## 2024-06-13 PROCEDURE — 99395 PREV VISIT EST AGE 18-39: CPT | Performed by: OBSTETRICS & GYNECOLOGY

## 2024-06-13 RX ORDER — NORGESTIMATE AND ETHINYL ESTRADIOL 7DAYSX3 LO
1 KIT ORAL DAILY
Qty: 84 TABLET | Refills: 3 | Status: SHIPPED | OUTPATIENT
Start: 2024-06-13

## 2024-06-14 LAB — TSH SERPL DL<=0.005 MIU/L-ACNC: 3.25 UIU/ML (ref 0.3–4.2)

## 2025-05-14 ENCOUNTER — PATIENT OUTREACH (OUTPATIENT)
Dept: CARE COORDINATION | Facility: CLINIC | Age: 40
End: 2025-05-14
Payer: COMMERCIAL

## 2025-05-28 ENCOUNTER — PATIENT OUTREACH (OUTPATIENT)
Dept: CARE COORDINATION | Facility: CLINIC | Age: 40
End: 2025-05-28
Payer: COMMERCIAL

## 2025-07-27 ENCOUNTER — HEALTH MAINTENANCE LETTER (OUTPATIENT)
Age: 40
End: 2025-07-27